# Patient Record
Sex: MALE | Race: WHITE | NOT HISPANIC OR LATINO | Employment: FULL TIME | RURAL
[De-identification: names, ages, dates, MRNs, and addresses within clinical notes are randomized per-mention and may not be internally consistent; named-entity substitution may affect disease eponyms.]

---

## 2018-12-28 PROBLEM — K21.9 GASTROESOPHAGEAL REFLUX DISEASE WITHOUT ESOPHAGITIS: Chronic | Status: ACTIVE | Noted: 2018-12-28

## 2018-12-28 PROBLEM — Z72.0 TOBACCO ABUSE: Chronic | Status: ACTIVE | Noted: 2018-12-28

## 2019-01-17 PROBLEM — E78.2 MIXED HYPERLIPIDEMIA: Chronic | Status: ACTIVE | Noted: 2019-01-17

## 2019-01-17 PROBLEM — D17.9 LIPOMA: Chronic | Status: ACTIVE | Noted: 2019-01-17

## 2019-01-17 PROBLEM — I10 ESSENTIAL HYPERTENSION: Chronic | Status: ACTIVE | Noted: 2019-01-17

## 2020-01-24 PROBLEM — F17.200 SMOKING ADDICTION: Status: ACTIVE | Noted: 2020-01-24

## 2020-12-01 ENCOUNTER — HISTORICAL (OUTPATIENT)
Dept: ADMINISTRATIVE | Facility: HOSPITAL | Age: 60
End: 2020-12-01

## 2020-12-02 LAB — SARS-COV+SARS-COV-2 AG RESP QL IA.RAPID: NEGATIVE

## 2021-02-22 ENCOUNTER — TELEPHONE (OUTPATIENT)
Dept: GASTROENTEROLOGY | Facility: CLINIC | Age: 61
End: 2021-02-22

## 2025-02-07 PROBLEM — Z86.73 H/O: CVA (CEREBROVASCULAR ACCIDENT): Chronic | Status: ACTIVE | Noted: 2025-02-07

## 2025-02-13 ENCOUNTER — CLINICAL SUPPORT (OUTPATIENT)
Dept: REHABILITATION | Facility: HOSPITAL | Age: 65
End: 2025-02-13
Payer: MEDICARE

## 2025-02-13 DIAGNOSIS — R47.1 DYSARTHRIA: ICD-10-CM

## 2025-02-13 PROCEDURE — 92523 SPEECH SOUND LANG COMPREHEN: CPT | Mod: PO

## 2025-02-16 NOTE — PROGRESS NOTES
"  Outpatient Rehab    Speech-Language Pathology Evaluation (only)    Patient Name: Michael Dailey  MRN: [id number]  YOB: 1960  Today's Date: 2/16/2025    Therapy Diagnosis:   Encounter Diagnosis   Name Primary?    Dysarthria      Physician: Ivonne Cortes MD    Physician Orders: Eval and Treat  Medical Diagnosis: Dysarthria [R47.1]     Visit # / Visits Authorized:  1 / 1   Date of Evaluation:  2/13/2025  Insurance Authorization Period: 2/7/2025 to 12/31/2025  Plan of Care Certification:  2/13/2025 to 5/8/2025      Time In: 9:30  Time Out: 10:15  Total Time: 45  Total Billable Time: 45    Intake Outcome Measure for FOTO Survey  Due next session.    Subjective   History of Present Illness  Michael is a 64 y.o. male who reports to Speech-Language Pathology with a chief concern of Dysarthria and expressive aphasia. According to the patient's chart, Michael has a past medical history of GERD (gastroesophageal reflux disease), Hyperlipidemia, Hypertension, Insomnia, Pancreatitis, and Stroke.    Diagnostic tests related to this condition: VFSS/MBSS, X-ray, CT Scan, and MRI Studies.   CT Scan Details: 4/13/24 CTA: "FINDINGS:  CTA NECK:  ARCH: Common origin of the innominate and left common carotid arteries.  COMMON CAROTIDS: Proximal atherosclerosis without flow-limiting stenosis on the left.  EXTERNAL CAROTIDS: Normal.  INTERNAL CAROTIDS: Small amount of thrombus projecting into the lumen at the origin of the left internal carotid artery. Calcified and non-calcified plaque is present at both origin of the internal carotids, left more than right, with 70% stenosis on the left.  VERTEBRALS: Left proximal atherosclerosis without flow-limiting stenosis.  Dominant left vertebral artery. Hypoplasia of the right vertebral artery.  CTA HEAD:  INTERNAL CAROTIDS: Siphon atherosclerosis without flow-limiting stenosis seen bilaterally.  ANTERIOR CEREBRALS: Normal.  ANTERIOR COMMUNICATING: Normal.  MIDDLE CEREBRALS: Irregular " "caliber of the left M1.  POSTERIOR COMMUNICATING ARTERIES: Not well visualized bilaterally.    POSTERIOR CEREBRALS: Normal.  VERTEBROBASILAR AND BRANCH VESSELS: Normal.      MRI Studies Details: 4/10/24: "FINDINGS: Abnormal restricted diffusion and mildly expansile FLAIR signal abnormality in the left MCA territory, predominantly within the left frontal lobe, left insula, and basal ganglia with small focus in the left parietal lobe.. No evidence of abnormal susceptibility artifact or of intracranial hemorrhage . There is no mass effect or midline shift. The basal cisterns remain patent. . The ventricles are normal in size and position. There is no evidence of hydrocephalous.. The left ICA signal void is not preserved. The left MCA signal void appears narrowed but preserved."  VFSS/MBSS Details: 4/24/24: "Findings: Videofluoroscopic solid examination was performed in conjunction with the speech pathology department. Thin liquids and solid foods were given to the patient coated with barium to assess swallowing mechanism. Normal oral transit was observed. There was penetration without aspiration."    X-Ray Details: 4/19/24 XRAY ABDOMEN: "There is a small amount of stool burden in the colon. The bowel gas pattern is nonspecific. No free air or pneumatosis is identified. There is no evidence for organomegaly. No significant abnormal calcifications are seen."    Patient presents in clinic breathing with Room air.    Michael Dailey is a 64 year old male who presents to [facility] Therapy for evaluation secondary to Dysarthria (R47.1). Patient was referred to therapy by Ivonne Cortes, which is the patient's PCP. Patient and significant other report he has significant difficulty with communication, including word finding and formulating responses. Patient endorses difficulty concentrating on tasks and during conversations but denies general memory deficits. Patient's significant other also " expresses concerns about possible apraxia.     Current Speech Symptoms  Patient reports: Aphasia, Apraxia, Dysarthria    Pain  No Pain Reported      Review of Systems  Patient reports: Sleep Disturbance      Treatment History  Previous Treatments: Speech language therapy, Occupational therapy    Living Arrangements  Living Situation  Housing: Home independently  Living Arrangements: Spouse/significant other  Support Systems: Spouse/significant other    Employment  Employment Status: Retired     Past Medical History/Physical Systems Review:   Michael Dailey  has a past medical history of GERD (gastroesophageal reflux disease), Hyperlipidemia, Hypertension, Insomnia, Pancreatitis, and Stroke.    Michael Dailey  has no past surgical history on file.    Michael has a current medication list which includes the following prescription(s): amlodipine, atorvastatin, clopidogrel, fenofibrate, and omeprazole.    Review of patient's allergies indicates:  No Known Allergies     Objective    Auditory Comprehension  Responds to Name: Yes    Pointing to Items  Intact: Body Parts, Pictures, and Objects     Question Comprehension  Intact: Simple Yes/No Questions and Complex Yes/No Questions     Nonverbal Expression  The patient's nonverbal expression skills with Head nodding were assessed during the evaluation.      Effective: Head Nodding    Verbal Expression  Verbal Expression Assessment Supportive Technology Usage: No     Cognition  Orientation level is Oriented x4.      Observed attention impairments include Sustained attention.   Processing speed is Intact.      The patient demonstrates the ability to follow Two-step commands.           Cognitive-Communication  Social Communication  The patient's behavior and affect were observed and the patient was found to be Appropriate to situation.       Joint attention was evaluated and the patient was Able to respond.    Intact: Response to Humor/Figurative Language and Eye Contact    Communication  Modes and Devices     Patient Expressive Communication Modes: Verbal, Gestures  Verbalizations: Kirvin or scripted speech, Phrases/sentences, Single words  Current Receptive Communication Modes: Auditory      Western Aphasia Battery - Revised (WAB-R) was administered to evaluate the patient's receptive and expressive language function.The purpose stated in the manual for the WAB-R is to determine the presence, severity, and type of aphasia; measure the patient's level of performance to provide a baseline for detecting change over time; provide a comprehensive assessment of the patients language strengths and deficits in order to guide treatment and management; infer the location and etiology of the lesion causing the aphasia.  The entire assessment was unable to be completed due to time constraints but will be completed in subsequent treatment sessions. The following results were revealed:      Aphasia Classification: TBD pending completion of assessment in next session.     Subtest Results:   Information Content: 8 / 10 ,   Fluency, Grammatical Competence, and Paraphasias: 9 /10   Yes / No Questions: 60 / 60  Auditory Word Recognition: 58 / 60   Sequential Commands: 62 / 80    Repetition: Unable to finish due to time constraints   Object Naming: Unable to finish due to time constraints   Word Fluency: Unable to finish due to time constraints   Sentence Completion: Unable to finish due to time constraints   Responsive Speech: Unable to finish due to time constraints       Assessment & Plan   Assessment   Diagnosis and Impressions: Patient presents to [facility] for evaluation following a medical diagnosis of Dysarthria (R47.1). Patient presents with expressive aphasia characterized by significant difficulty with overall communication, including word finding and formulating responses during conversational exchanges. His significant other, present during the evaluation, reports that they practice  communication strategies at home regularly, but he continues to struggle. Portions of the WAB were administered today; however, the assessment could not be fully completed due to time constraints. WAB to be continued. Mild dysarthric characteristics were noted, including slightly reduced vocal intensity and occasional breathiness; however, speech remained  intelligible to an unfamiliar listener. During informal assessment, the patient's significant other primarily provided responses, limiting the ability to fully assess his spontaneous speech. Speech and language abilities were instead observed throughout the evaluation.     Evaluation/Treatment Response: Patient responded to treatment well     Patient Goal for Therapy (SLP): To be able to communicate effectively.  Prognosis: Good         Goals  Active       LTG 1        Start:  02/16/25    Expected End:  05/08/25       Pt will develop functional expressive language skills to communicate wants,needs, and emotions effectively to variety of communication partners in medical and home environments         LTG 2        Start:  02/16/25    Expected End:  05/08/25       Pt will participate in ongoing assessment of cognitive-linguistic skills.          STG 1       Start:  02/16/25    Expected End:  03/30/25       Patient will complete subtests of the WAB to further characterize aphasia and establish goals         Education  Education was done with Patient and Other recipient present. The patient's learning style includes Listening. The patient Demonstrates understanding.  They identified as Spouse/significant other. The reported learning style is Listening. The recipient Verbalizes understanding and Demonstrates understanding.     Plan  From a speech language pathology perspective, the patient would benefit from: Skilled Rehab Services  Planned therapy interventions and modalities include: Speech/language therapy, Patient/family education, and Home program instruction.     Planned evaluations to include: Speech/language evaluation.        Visit Frequency: 2 times Per Week for 12 Weeks.     This plan was discussed with Patient and Caregiver.  Discussion participants: Agreed Upon Plan of Care     Patient's spiritual, cultural, and educational needs considered and patient agreeable to plan of care and goals.     Goals:   Active       Long Term Goals       LTG 1        Start:  02/16/25    Expected End:  05/08/25       Pt will develop functional expressive language skills to communicate wants,needs, and emotions effectively to variety of communication partners in medical and home environments         LTG 2        Start:  02/16/25    Expected End:  05/08/25       Pt will participate in ongoing assessment of cognitive-linguistic skills.             Short Term Goals        STG 1       Start:  02/16/25    Expected End:  03/30/25       Patient will complete subtests of the WAB to further characterize aphasia and establish goals              YESSI Lanier  02/16/2025

## 2025-02-20 ENCOUNTER — OFFICE VISIT (OUTPATIENT)
Dept: PRIMARY CARE CLINIC | Facility: CLINIC | Age: 65
End: 2025-02-20
Payer: MEDICARE

## 2025-02-20 VITALS
WEIGHT: 185.31 LBS | HEART RATE: 76 BPM | BODY MASS INDEX: 25.94 KG/M2 | DIASTOLIC BLOOD PRESSURE: 60 MMHG | RESPIRATION RATE: 18 BRPM | OXYGEN SATURATION: 98 % | HEIGHT: 71 IN | SYSTOLIC BLOOD PRESSURE: 110 MMHG

## 2025-02-20 DIAGNOSIS — R49.0 RASPY VOICE: ICD-10-CM

## 2025-02-20 DIAGNOSIS — Z51.81 MEDICATION MONITORING ENCOUNTER: ICD-10-CM

## 2025-02-20 DIAGNOSIS — E78.2 MIXED HYPERLIPIDEMIA: Chronic | ICD-10-CM

## 2025-02-20 DIAGNOSIS — J44.9 CHRONIC OBSTRUCTIVE PULMONARY DISEASE, UNSPECIFIED COPD TYPE: ICD-10-CM

## 2025-02-20 DIAGNOSIS — R47.01 EXPRESSIVE APHASIA: ICD-10-CM

## 2025-02-20 DIAGNOSIS — Z87.891 FORMER SMOKER: ICD-10-CM

## 2025-02-20 DIAGNOSIS — Z86.73 HISTORY OF CVA (CEREBROVASCULAR ACCIDENT): ICD-10-CM

## 2025-02-20 DIAGNOSIS — Z13.6 ENCOUNTER FOR SCREENING FOR CARDIOVASCULAR DISORDERS: ICD-10-CM

## 2025-02-20 DIAGNOSIS — G20.A1 PARKINSON'S DISEASE, UNSPECIFIED WHETHER DYSKINESIA PRESENT, UNSPECIFIED WHETHER MANIFESTATIONS FLUCTUATE: ICD-10-CM

## 2025-02-20 DIAGNOSIS — Z76.89 ENCOUNTER TO ESTABLISH CARE: Primary | ICD-10-CM

## 2025-02-20 PROCEDURE — 99214 OFFICE O/P EST MOD 30 MIN: CPT | Mod: PBBFAC,PN | Performed by: STUDENT IN AN ORGANIZED HEALTH CARE EDUCATION/TRAINING PROGRAM

## 2025-02-20 RX ORDER — ATORVASTATIN CALCIUM 80 MG/1
80 TABLET, FILM COATED ORAL DAILY
COMMUNITY
Start: 2024-04-29

## 2025-02-20 RX ORDER — QUETIAPINE FUMARATE 25 MG/1
25-50 TABLET, FILM COATED ORAL NIGHTLY
COMMUNITY

## 2025-02-20 RX ORDER — GEMFIBROZIL 600 MG/1
600 TABLET, FILM COATED ORAL
Qty: 180 TABLET | Refills: 3 | Status: SHIPPED | OUTPATIENT
Start: 2025-02-20

## 2025-02-20 RX ORDER — MULTIVITAMIN
1 TABLET ORAL DAILY
COMMUNITY

## 2025-02-20 RX ORDER — TRAMADOL HYDROCHLORIDE 50 MG/1
50 TABLET ORAL EVERY 6 HOURS PRN
COMMUNITY

## 2025-02-20 RX ORDER — NAPROXEN SODIUM 220 MG/1
1 TABLET, FILM COATED ORAL DAILY
COMMUNITY
Start: 2024-04-29

## 2025-02-20 RX ORDER — CYANOCOBALAMIN 1000 UG/ML
1000 INJECTION, SOLUTION INTRAMUSCULAR; SUBCUTANEOUS
COMMUNITY

## 2025-02-20 RX ORDER — CARBIDOPA AND LEVODOPA 25; 100 MG/1; MG/1
1 TABLET ORAL 3 TIMES DAILY
COMMUNITY
Start: 2025-02-05

## 2025-02-20 RX ORDER — FLUOXETINE HYDROCHLORIDE 40 MG/1
40 CAPSULE ORAL DAILY
COMMUNITY
Start: 2024-12-02

## 2025-02-20 NOTE — PROGRESS NOTES
Subjective:       Patient ID: Michael Dailey is a 64 y.o. male.    Chief Complaint: Establish Care    HPI:  64 y.o. male presents to [facility] to establish care    Acute concerns?: None today's visit  Last April 2024 had CVA. Some difficulty with expressive aphasia.    PCP was in [place], [place].    Following with Neurologist in [place], [place]. Dr. Sanchez    Needing to establish care.    Uncertain if weakness/poor coordination to RUE and hand. Occasionally may need Tramadol for pain. Maybe 1-2 times weekly at most.    2 times weekly working with speech therapy for expressive aphasia. Also Dx apraxia.    Last PCP?: Dr. Ivonne Cortes  Allergies: NKDA  Medical History: GERD, HLD, HTN, insomnia, pancreatitis, stroke, parkinson's disease, expressive aphasia, apraxia, COPD  Medications: amlidipine 10 mg, clopidogrel 75 mg, omeprazole 40 mg, ASA 81 mg, atorvastatin 80 mg, carbidopa-levodopa  mg tid, fluoxetine 40 mg, gemfibrozil 600 mg, MVI, tramadol 50 mg PRN, quetiapine 25-50 mg qHS  Surgical History: Carotid artery angioplasty, appendectomy  Family History: Sister with unknown cancer; no known autoimmune disease; no dementias  Social History: Quit smoking last year with stroke; no EtOH, no illicits      Last tetanus vaccine?: ~3 years ago      Review of Systems   Constitutional:  Negative for chills, diaphoresis, fatigue and fever.   HENT:  Negative for congestion, sinus pressure, sneezing and sore throat.    Respiratory:  Negative for cough and shortness of breath.    Cardiovascular:  Negative for chest pain and palpitations.   Gastrointestinal:  Negative for abdominal pain, diarrhea, nausea and vomiting.   Musculoskeletal:  Negative for arthralgias, joint swelling and myalgias.   Skin:  Negative for rash and wound.   Neurological:  Positive for weakness (RUE). Negative for dizziness and light-headedness.       Objective:      Vitals:    02/20/25 0942   BP: 110/60   BP Location: Right arm   Patient Position:  "Sitting   Pulse: 76   Resp: 18   SpO2: 98%   Weight: 84.1 kg (185 lb 4.8 oz)   Height: 5' 11" (1.803 m)     Physical Exam  Vitals reviewed.   Constitutional:       General: He is not in acute distress.     Appearance: Normal appearance. He is not ill-appearing.   HENT:      Head: Normocephalic and atraumatic.   Eyes:      General:         Right eye: No discharge.         Left eye: No discharge.      Conjunctiva/sclera: Conjunctivae normal.   Cardiovascular:      Rate and Rhythm: Normal rate and regular rhythm.      Pulses: Normal pulses.      Heart sounds: No murmur heard.  Pulmonary:      Effort: Pulmonary effort is normal.      Breath sounds: Normal breath sounds.   Musculoskeletal:         General: No deformity.      Cervical back: Neck supple. No rigidity.   Lymphadenopathy:      Cervical: No cervical adenopathy.   Skin:     General: Skin is warm and dry.      Coloration: Skin is not jaundiced.   Neurological:      General: No focal deficit present.      Mental Status: He is alert and oriented to person, place, and time.   Psychiatric:         Mood and Affect: Mood normal.         Behavior: Behavior normal.             Lab Results   Component Value Date     04/29/2024     01/21/2020    K 3.8 04/29/2024    K 4.4 01/21/2020     01/21/2020    CO2 28 04/29/2024    CO2 28 01/21/2020    BUN 18.3 04/29/2024    BUN 12 01/21/2020    CREATININE 0.66 (L) 04/29/2024    CREATININE 0.76 01/21/2020    GLUCOSE 105 04/29/2024    ANIONGAP 7 (L) 04/29/2024     Lab Results   Component Value Date    HGBA1C 6.1 08/08/2024    HGBA1C 5.5 01/16/2019     No results found for: "BNP", "BNPTRIAGEBLO"    Lab Results   Component Value Date    WBC 12.1 (H) 01/21/2020    HGB 15.2 01/21/2020    HCT 44.3 01/21/2020     01/21/2020     Lab Results   Component Value Date    CHOL 191 04/10/2024    CHOL 170 01/21/2020    HDL 33 (L) 04/10/2024    HDL 35 (L) 01/21/2020    LDLCALC 101 04/10/2024    LDLCALC 103 (H) 01/21/2020    " TRIG 284 (H) 04/10/2024    TRIG 208 (H) 01/21/2020        Current Medications[1]        Assessment:       1. Encounter to establish care    2. History of CVA (cerebrovascular accident)    3. Expressive aphasia    4. Raspy voice    5. Former smoker    6. Mixed hyperlipidemia    7. Parkinson's disease, unspecified whether dyskinesia present, unspecified whether manifestations fluctuate    8. Chronic obstructive pulmonary disease, unspecified COPD type           Plan:       Encounter to establish care    History of CVA (cerebrovascular accident)  Parkinson's disease, unspecified whether dyskinesia present, unspecified whether manifestations fluctuate  Expressive aphasia  Raspy voice  -     Ambulatory referral/consult to ENT; Future; Expected date: 02/27/2025  - With reports of recent raspy voice, will refer to ENT for visual examination  - Keep follow-up with Dr. Sanchez, no recent worsening symptoms    Mixed hyperlipidemia  -     gemfibroziL (LOPID) 600 MG tablet; Take 1 tablet (600 mg total) by mouth 2 (two) times daily before meals.  Dispense: 180 tablet; Refill: 3    Chronic obstructive pulmonary disease, unspecified COPD type  Former smoker  -      Abdominal Aorta; Future; Expected date: 02/20/2025  - No decreased stamina or shortness of breath reported a tthis time  - Continue smoking cessation    RTC in 6 months         [1]   Current Outpatient Medications:     amLODIPine (NORVASC) 10 MG tablet, Take 1 tablet (10 mg total) by mouth once daily., Disp: 90 tablet, Rfl: 0    aspirin 81 MG Chew, Take 1 tablet by mouth once daily., Disp: , Rfl:     atorvastatin (LIPITOR) 80 MG tablet, Take 80 mg by mouth once daily., Disp: , Rfl:     carbidopa-levodopa  mg (SINEMET)  mg per tablet, Take 1 tablet by mouth 3 (three) times daily., Disp: , Rfl:     clopidogreL (PLAVIX) 75 mg tablet, Take 1 tablet (75 mg total) by mouth once daily., Disp: 90 tablet, Rfl: 0    cyanocobalamin 1,000 mcg/mL injection, Inject 1,000  mcg into the muscle every 30 days., Disp: , Rfl:     FLUoxetine 40 MG capsule, Take 40 mg by mouth once daily., Disp: , Rfl:     multivit with min-folic acid 0.4 mg Tab, Take 1 tablet by mouth once daily., Disp: , Rfl:     omeprazole (PRILOSEC) 40 MG capsule, Take 1 capsule (40 mg total) by mouth once daily., Disp: 90 capsule, Rfl: 0    QUEtiapine (SEROQUEL) 25 MG Tab, Take 25-50 mg by mouth every evening., Disp: , Rfl:     traMADoL (ULTRAM) 50 mg tablet, Take 50 mg by mouth every 6 (six) hours as needed., Disp: , Rfl:     gemfibroziL (LOPID) 600 MG tablet, Take 1 tablet (600 mg total) by mouth 2 (two) times daily before meals., Disp: 180 tablet, Rfl: 3

## 2025-02-21 ENCOUNTER — CLINICAL SUPPORT (OUTPATIENT)
Dept: REHABILITATION | Facility: HOSPITAL | Age: 65
End: 2025-02-21
Payer: MEDICARE

## 2025-02-21 DIAGNOSIS — R48.2 APRAXIA: ICD-10-CM

## 2025-02-21 DIAGNOSIS — R47.01 EXPRESSIVE APHASIA: Primary | ICD-10-CM

## 2025-02-21 PROCEDURE — 92507 TX SP LANG VOICE COMM INDIV: CPT | Mod: PO

## 2025-02-21 NOTE — PROGRESS NOTES
Outpatient Rehab    Speech-Language Pathology Visit    Patient Name: Michael Dailey  MRN: [id number]  YOB: 1960  Today's Date: 2/21/2025    Therapy Diagnosis:   Encounter Diagnoses   Name Primary?    Expressive aphasia Yes    Apraxia      Physician: Ivonne Cortes MD    Physician Orders: Eval and Treat  Medical Diagnosis: Physician Orders: Eval and Treat  Medical Diagnosis: Dysarthria [R47.1]    Visit # / Visits Authorized:  1 / 20   Date of Evaluation:  2/13/2025   Insurance Authorization Period: 2/10/2025 to 12/31/2025  Plan of Care Certification: 2/13/2025 to 5/8/2025       Time In: 10:32  Time Out: 11:00  Total Time: 28  Total Billable Time: 28    FOTO:  Due    Subjective        Patient reported no pain and showed up in pleasant spirits. Significant other attended session.     Objective          Treatment  Short Term Goals: (10 weeks) Current Progress:   Patient will complete subtests of the WAB to further characterize aphasia and establish goals    Progressing/ Not Met 2/21/2025   - Finished subtest this date.   Goal met 2/21/25     See results below.      Goal added this date: Patient will provide 3+ words to describe a given picture/object at 80% success given min cues for initiation/word retrieval      Progressing/ Not Met 2/21/2025   Goal added this date    Goal added this date: Patient will imitate words of increasing syllable length, starting with single-syllable words and progressing to multi-syllabic words, with 80% accuracy    Progressing/ Not Met 2/21/2025   Goal added this date     Goal added this date: Patient will expressively produce 3+ word phrase to state object function with 80% acc given initial phoneme cue as needed across 3 sessions.      Progressing/ Not Met 2/21/2025   Goal added this date     Goal added this date: Patient will complete divided attention tasks in natural environment with 90% accuracy independently.     Progressing/ Not Met 2/21/2025   Goal added this date    "  Goal added this date: Patient will respond accurately to simple "wh" questions for one word response with 90% acc      Progressing/ Not Met 2025   Goal added this date     Goal added this date:  Patient will generate 10-12 items from an abstract category with a time constraint of 1 minute.     Progressing/ Not Met 2025   Goal added this date         Western Aphasia Battery - Revised (WAB-R) was administered to evaluate the patient's receptive and expressive language function.The purpose stated in the manual for the WAB-R is to determine the presence, severity, and type of aphasia; measure the patient's level of performance to provide a baseline for detecting change over time; provide a comprehensive assessment of the patients language strengths and deficits in order to guide treatment and management; infer the location and etiology of the lesion causing the aphasia.  The following results were revealed:     Spontaneous Speech Score: 17 / 20  Auditory Comprehension Score: 9 / 10  Repetition Score:   8.8 /10  Naming and Word Finding Score: 8.6/ 10  Aphasia Quotient (AQ):   86.8 / 100  Aphasia Classification: Anomic Aphasia    Subtest Results:   Information Content: 8 / 10  Fluency, Grammatical Competence, and Paraphasias: 9 /10  Yes / No Questions: 60 / 60  Auditory Word Recognition: 58 / 60  Sequential Commands: 62 / 80  Repetition: 88 /100  Object Namin /60  Word Fluency: 7 / 20  Sentence Completion: 9 /10  Responsive Speech: 10 / 10    The patient exhibits mild-moderate expressive language deficits, significantly impacting his ability to effectively communicate wants and needs.     Assessment & Plan   Assessment  Patient participated well in today's session focused on completion of WAB assessment. See results above. New goals added this date given results of WAB as well as patient requested goals. Patient presents with mild-moderate expressive language deficits at this time in addition to mild " apraxia of speech. Patient exhibits good self awareness of errors and is highly motivated to participate in speech therapy to target expressive language deficits. No dysarthric speech characteristics noted in today's session. Goals to be updated as necessary. See above for new added goals. Patient prognosis is Good. Patient will continue to benefit from skilled outpatient speech and language therapy to address the deficits listed in the problem list on initial evaluation, provide patient/family education and to maximize patient's level of independence in the home and community environment.     Patient will continue to benefit from skilled outpatient speech therapy to address the deficits listed in the problem list box on initial evaluation, provide pt/family education and to maximize pt's level of independence in the home and community environment.     Patient's spiritual, cultural, and educational needs considered and patient agreeable to plan of care and goals.     Plan  Continue plan of care as established.       MARCIN Lanier-SLP  02/21/2025

## 2025-02-24 ENCOUNTER — CLINICAL SUPPORT (OUTPATIENT)
Dept: REHABILITATION | Facility: HOSPITAL | Age: 65
End: 2025-02-24
Payer: MEDICARE

## 2025-02-24 DIAGNOSIS — R47.01 EXPRESSIVE APHASIA: Primary | ICD-10-CM

## 2025-02-24 DIAGNOSIS — R48.2 APRAXIA: ICD-10-CM

## 2025-02-24 PROCEDURE — 92507 TX SP LANG VOICE COMM INDIV: CPT | Mod: PO

## 2025-02-24 NOTE — PROGRESS NOTES
Outpatient Rehab    Speech-Language Pathology Visit    Patient Name: Michael Dailey  MRN: [id number]  YOB: 1960  Today's Date: 2/24/2025    Therapy Diagnosis:   Encounter Diagnoses   Name Primary?    Expressive aphasia Yes    Apraxia      Physician: Ivonne Cortes MD    Physician Orders: Eval and Treat  Medical Diagnosis: Physician Orders: Eval and Treat  Medical Diagnosis: Dysarthria [R47.1]    Visit # / Visits Authorized:  2 / 20   Date of Evaluation:  2/13/2025   Insurance Authorization Period: 2/10/2025 to 12/31/2025  Plan of Care Certification: 2/13/2025 to 5/8/2025       Time In: 1:00  Time Out: 1:45  Total Time: 45  Total Billable Time: 45    Subjective        Patient reports no pain and showed up in pleasant spirits.     Objective          Treatment  Short Term Goals: (10 weeks) Current Progress:   Patient will complete subtests of the WAB to further characterize aphasia and establish goals    Progressing/ Not Met 2/24/2025      Goal met 2/21/25          2. Patient will provide 3+ words to describe a given picture/object at 80% success given min cues for initiation/word retrieval      Progressing/ Not Met 2/24/2025   - Not addressed in today's session.     3. Patient will imitate words of increasing syllable length, starting with single-syllable words and progressing to multi-syllabic words, with 80% accuracy    Progressing/ Not Met 2/24/2025   - Patient able to imitate single syllable words with 100% accuracy     -Patient given pacing board for multisyllabic words; 2 syllable words produced with 80% accuracy; 3 syllable words produced with 70% accuracy     4. Patient will expressively produce 3+ word phrase to state object function with 80% acc given initial phoneme cue as needed across 3 sessions.      Progressing/ Not Met 2/24/2025   - Patient expressively produced 2-3 word phrases to state object function with 70% accuracy given moderate cues     - Patient use of 3 word phrases consistently  "     5. Patient will complete divided attention tasks in natural environment with 90% accuracy independently.     Progressing/ Not Met 2/24/2025   Not addressed in today's session.     6.  Patient will respond accurately to simple "wh" questions for one word response with 90% acc      Progressing/ Not Met 2/24/2025   - Patient responded accurately to simple "wh" questions with 80% accuracy     - More difficulty with "why" questions        7.  Patient will generate 10-12 items from an abstract category with a time constraint of 1 minute.     Progressing/ Not Met 2/24/2025   - Patient able to generate 4/10 things that could be found in the fridge in 1 minute         Assessment & Plan   Assessment  Patient participated well in today's session focused on expressive/receptive language. Reported no pain in today's session. Patient appropriately named object functions with 2 word describers and occasional 3 words; some difficulty noted at first but once patient focused on task, he did it appropriately. Patient's production of 2 syllable words significantly better than 3+ syllable words due to AOS. Pacing board utilized during multi-syllabic word production; increase in accuracy of word production when using pacing board. Discussed use of pacing board in future session and patient verbalized understanding and agreement. Increase in target word production for "things found in fridge" when using visual cues (I.e. alphabet cards) out of time constraint. Patient able to accurately verbalize word once correct initial letter is found. Some difficulty with 'why' questions in today's session. Goals to be updated as necessary. See above for new added goals. Patient prognosis is Good. Patient will continue to benefit from skilled outpatient speech and language therapy to address the deficits listed in the problem list on initial evaluation, provide patient/family education and to maximize patient's level of independence in the home " and community environment.     Patient will continue to benefit from skilled outpatient speech therapy to address the deficits listed in the problem list box on initial evaluation, provide pt/family education and to maximize pt's level of independence in the home and community environment.     Patient's spiritual, cultural, and educational needs considered and patient agreeable to plan of care and goals.     Plan  Continue plan of care as established.       MARCIN Lanier-SLP  02/24/2025

## 2025-02-25 ENCOUNTER — RESULTS FOLLOW-UP (OUTPATIENT)
Dept: PRIMARY CARE CLINIC | Facility: CLINIC | Age: 65
End: 2025-02-25

## 2025-02-26 ENCOUNTER — CLINICAL SUPPORT (OUTPATIENT)
Dept: REHABILITATION | Facility: HOSPITAL | Age: 65
End: 2025-02-26
Payer: MEDICARE

## 2025-02-26 DIAGNOSIS — R48.2 APRAXIA: ICD-10-CM

## 2025-02-26 DIAGNOSIS — R47.01 EXPRESSIVE APHASIA: Primary | ICD-10-CM

## 2025-02-26 PROCEDURE — 92507 TX SP LANG VOICE COMM INDIV: CPT | Mod: PO

## 2025-02-26 NOTE — PROGRESS NOTES
Outpatient Rehab    Speech-Language Pathology Visit    Patient Name: Michael Dailey  MRN: [id number]  YOB: 1960  Today's Date: 2/26/2025    Therapy Diagnosis:   Encounter Diagnoses   Name Primary?    Expressive aphasia Yes    Apraxia      Physician: Ivonne Cortes MD    Physician Orders: Eval and Treat  Medical Diagnosis: Physician Orders: Eval and Treat  Medical Diagnosis: Dysarthria [R47.1]    Visit # / Visits Authorized:  3 / 20   Date of Evaluation:  2/13/2025   Insurance Authorization Period: 2/10/2025 to 12/31/2025  Plan of Care Certification: 2/13/2025 to 5/8/2025       Time In: 1:00  Time Out: 1:45  Total Time: 45  Total Billable Time: 45    Subjective        Patient reports no pain and showed up in pleasant spirits. Significant other did not attend session this date.     Objective          Treatment  Short Term Goals: (10 weeks) Current Progress:   Patient will complete subtests of the WAB to further characterize aphasia and establish goals    Progressing/ Not Met 2/26/2025      Goal met 2/21/25          2. Patient will provide 3+ words to describe a given picture/object at 80% success given min cues for initiation/word retrieval      Progressing/ Not Met 2/26/2025   - Not addressed in today's session.     3. Patient will imitate words of increasing syllable length, starting with single-syllable words and progressing to multi-syllabic words, with 80% accuracy    Progressing/ Not Met 2/26/2025   -Patient utilized pacing board for multisyllabic words; 2 syllable words produced with 85% accuracy; 3 syllable words produced with 70% accuracy    -Patient requires multiple prompts to use pacing board      4. Patient will expressively produce 3+ word phrase to state object function with 80% acc given initial phoneme cue as needed across 3 sessions.      Progressing/ Not Met 2/26/2025   - Not addressed in today's session.       5. Patient will complete divided attention tasks in natural environment  "with 90% accuracy independently.     Progressing/ Not Met 2/26/2025   - Patient able to complete divided attention task with natural background noise with 85% accuracy       6.  Patient will respond accurately to simple "wh" questions for one word response with 90% acc      Progressing/ Not Met 2/26/2025   - Patient responded accurately to simple "wh" questions with 85% accuracy     - Significant difficulty with "why" questions in today's session        7.  Patient will generate 10-12 items from an abstract category with a time constraint of 1 minute.     Progressing/ Not Met 2/26/2025   - Patient able to generate 6/10 "things that could be found in the fridge" in 1 minute    -Given a broader category "things found in the kitchen" 8/10     - Patient able to generate 7/10 vegetables independently          Assessment & Plan   Assessment  Patient participated well in today's session focused on expressive/receptive language and divided attention. Patient appropriately named object functions with 2 word describers and occasional 3 words; some difficulty noted at first but once patient focused on task, he did it appropriately. Patient's production of 2 syllable words significantly better than 3+ syllable words due to AOS. Pacing board utilized during multi-syllabic word production; increase in accuracy of word production when using pacing board. Discussed use of pacing board in future session and patient verbalized understanding and agreement. Increase in target word production for "things found in fridge" when using visual cues (I.e. alphabet cards) out of time constraint. Patient able to accurately verbalize word once correct initial letter is found. Some difficulty with 'why' questions in today's session. Prompted to use word finding strategies when unable to think of target words/answers. Goals to be updated as necessary. See above for new added goals. Patient prognosis is Good. Patient will continue to benefit from " skilled outpatient speech and language therapy to address the deficits listed in the problem list on initial evaluation, provide patient/family education and to maximize patient's level of independence in the home and community environment.     Patient will continue to benefit from skilled outpatient speech therapy to address the deficits listed in the problem list box on initial evaluation, provide pt/family education and to maximize pt's level of independence in the home and community environment.     Patient's spiritual, cultural, and educational needs considered and patient agreeable to plan of care and goals.     Plan  Continue plan of care as established.       MARCIN Lanier-SLP  02/26/2025

## 2025-03-05 RX ORDER — CYANOCOBALAMIN 1000 UG/ML
1000 INJECTION, SOLUTION INTRAMUSCULAR; SUBCUTANEOUS
Qty: 1 ML | Refills: 11 | Status: SHIPPED | OUTPATIENT
Start: 2025-03-05

## 2025-03-05 NOTE — TELEPHONE ENCOUNTER
Refill Routing Note   Medication(s) are not appropriate for processing by [facility] for the following reason(s):        Outside of protocol    [facility] action(s):  Route               Appointments  past 12m or future 3m with PCP    Date Provider   Last Visit   2/20/2025 Alessio Bennett MD   Next Visit   Visit date not found Alessio Bennett MD   ED visits in past 90 days: 0        Note composed:9:07 AM 03/05/2025

## 2025-03-10 ENCOUNTER — CLINICAL SUPPORT (OUTPATIENT)
Dept: REHABILITATION | Facility: HOSPITAL | Age: 65
End: 2025-03-10
Payer: MEDICARE

## 2025-03-10 ENCOUNTER — OFFICE VISIT (OUTPATIENT)
Dept: OTOLARYNGOLOGY | Facility: CLINIC | Age: 65
End: 2025-03-10
Payer: MEDICARE

## 2025-03-10 VITALS — HEIGHT: 71 IN | BODY MASS INDEX: 25.89 KG/M2 | WEIGHT: 184.94 LBS

## 2025-03-10 DIAGNOSIS — R47.01 EXPRESSIVE APHASIA: Primary | ICD-10-CM

## 2025-03-10 DIAGNOSIS — R49.0 RASPY VOICE: ICD-10-CM

## 2025-03-10 DIAGNOSIS — R48.2 APRAXIA: ICD-10-CM

## 2025-03-10 DIAGNOSIS — R47.1 DYSARTHRIA: ICD-10-CM

## 2025-03-10 DIAGNOSIS — R47.01 EXPRESSIVE APHASIA: ICD-10-CM

## 2025-03-10 DIAGNOSIS — K21.9 LARYNGOPHARYNGEAL REFLUX (LPR): ICD-10-CM

## 2025-03-10 DIAGNOSIS — Z86.73 HISTORY OF CVA (CEREBROVASCULAR ACCIDENT): ICD-10-CM

## 2025-03-10 DIAGNOSIS — R49.0 HOARSENESS: Primary | ICD-10-CM

## 2025-03-10 PROCEDURE — 99999 PR PBB SHADOW E&M-EST. PATIENT-LVL III: CPT | Mod: PBBFAC,,, | Performed by: OTOLARYNGOLOGY

## 2025-03-10 PROCEDURE — 92507 TX SP LANG VOICE COMM INDIV: CPT | Mod: PO

## 2025-03-10 PROCEDURE — 31575 DIAGNOSTIC LARYNGOSCOPY: CPT | Mod: PBBFAC,PO | Performed by: OTOLARYNGOLOGY

## 2025-03-10 PROCEDURE — 99203 OFFICE O/P NEW LOW 30 MIN: CPT | Mod: 25,S$PBB,, | Performed by: OTOLARYNGOLOGY

## 2025-03-10 PROCEDURE — 99213 OFFICE O/P EST LOW 20 MIN: CPT | Mod: PBBFAC,PO | Performed by: OTOLARYNGOLOGY

## 2025-03-10 PROCEDURE — 31575 DIAGNOSTIC LARYNGOSCOPY: CPT | Mod: S$PBB,,, | Performed by: OTOLARYNGOLOGY

## 2025-03-10 NOTE — PROGRESS NOTES
"Subjective:       Patient ID: Michael Dailey is a 64 y.o. male.    Chief Complaint: Hoarse (Patient has hx of CVA in April 24, has expressive dysphagia and presenting with "raspy/hoarse voice." Here for evaluation of the hoarseness to determine if it's a result of the stroke or something different. )      This gentleman has a CVA last year and he just does not speak much because he has some expressive aphasia it does not look like he otherwise has a great deal of impairment he walks normally and has a apparently normal cognition.  He has moved down from [place] and stays with his partner and they have noticed that when he does speak which he speaks fine except that it takes him quite a minute to think of the words to use that is a bit hoarse.  He smoked up until last year when he has a CVA.          Objective:      ENT Physical Exam    As we discuss his problem he does not speak much but the words he has said are a bit hoarse a bit raspy as they describe it     We discussed the benefits of a fiberoptic exam that include a better view of the larynx a better view of the anterior aspect of the larynx a more magnified view of the larynx hypopharynx and nasopharynx in the context of the patient's particular complaint and the patient wishes to proceed with this minor examination.  In this particular patient examination with the mirror was attempted but inadequate to provide the necessary exam.    Afrin and lidocaine were atomized into the nasal airway 5 or so minutes were given for the decongestant and anesthetic to take effect and then the flexible fiberoptic laryngoscope was passed through the nasal cavity and the exam proceeded.    The scope was passed on the right without difficulty and proceeded into the nasopharynx which was normal is hypopharynx and oropharynx to the extent visible were normal, his cords move symmetrically and the true cords look normal there has a small one mm nodule at the anterior part of the " arytenoids and there significant redness of the medial arytenoids bilaterally.            Assessment:       1. Hoarseness    2. Expressive aphasia    3. History of CVA (cerebrovascular accident)    4. Raspy voice         Plan:          So his scope exam shows a tiny little nodule on each cord at the anterior portion of the arytenoid and some redness of the medial arytenoids.  He was recently changed to 40 of omeprazole q.a.m. from 20 that he had long been on but that was just few weeks ago.  I have encouraged him to stay on that for at least a few months and if that has not helped his voice to contact me and we will go up to b.i.d. for awhile although his voice isn't that bad I think there were more concern there were something dangerous going on that need attention and I do not see anything worrisome on his fiberoptic laryngeal exam.

## 2025-03-10 NOTE — PROGRESS NOTES
Outpatient Rehab    Speech-Language Pathology Visit    Patient Name: Michael Dailey  MRN: [id number]  YOB: 1960  Today's Date: 3/10/2025    Therapy Diagnosis:   Encounter Diagnoses   Name Primary?    Expressive aphasia Yes    Apraxia     Dysarthria        Physician: Ivonne Cortes MD    Physician Orders: Eval and Treat  Medical Diagnosis: Physician Orders: Eval and Treat  Medical Diagnosis: Dysarthria [R47.1]    Visit # / Visits Authorized:  4 / 20   Date of Evaluation:  2/13/2025   Insurance Authorization Period: 2/10/2025 to 12/31/2025  Plan of Care Certification: 2/13/2025 to 5/8/2025       Time In: 1:00  Time Out: 1:45  Total Time: 45  Total Billable Time: 45    Subjective        Patient reports sleeping well and showed up in pleasant spirits.      Objective          Treatment  Short Term Goals: (10 weeks) Current Progress:   Patient will complete subtests of the WAB to further characterize aphasia and establish goals    Progressing/ Not Met 3/10/2025      Goal met 2/21/25          2. Patient will provide 3+ words to describe a given picture/object at 80% success given min cues for initiation/word retrieval      Progressing/ Not Met 3/10/2025   - Not addressed in today's session.     3. Patient will imitate words of increasing syllable length, starting with single-syllable words and progressing to multi-syllabic words, with 80% accuracy    Progressing/ Not Met 3/10/2025   -Not addressed in today's session.       4. Patient will expressively produce 3+ word phrase to state object function with 80% acc given initial phoneme cue as needed across 3 sessions.      Progressing/ Not Met 3/10/2025   - Patient able to produce 3+ word phrases to state object functions with 80% accuracy given minimal prompts and cues     Met x1       5. Patient will complete divided attention tasks in natural environment with 90% accuracy independently.     Progressing/ Not Met 3/10/2025   - Patient able to complete divided  "attention task with natural background noise with 95% accuracy    Met x1     6.  Patient will respond accurately to simple "wh" questions for one word response with 90% acc      Progressing/ Not Met 3/10/2025   - Patient responded accurately to simple "wh" questions with 90% accuracy       Met x1     7.  Patient will generate 10-12 items from an abstract category with a time constraint of 1 minute.     Progressing/ Not Met 3/10/2025   - Not addressed in today's session.           Assessment & Plan   Assessment  Patient participated well in today's session focused on expressive/receptive language and divided attention. Patient appropriately named object functions with 3 word phrases consistently and occasional 4-5 word phrases. Increase in accuracy when answering 'wh' questions in today's session. Prompted to use word finding strategies when unable to think of target words/answers. Patient completed divided attention task with no difficulty; however, reported it was more difficult than previous tasks. Goals to be updated as necessary. See above for new added goals. Patient prognosis is Good. Patient will continue to benefit from skilled outpatient speech and language therapy to address the deficits listed in the problem list on initial evaluation, provide patient/family education and to maximize patient's level of independence in the home and community environment.     Patient will continue to benefit from skilled outpatient speech therapy to address the deficits listed in the problem list box on initial evaluation, provide pt/family education and to maximize pt's level of independence in the home and community environment.     Patient's spiritual, cultural, and educational needs considered and patient agreeable to plan of care and goals.     Plan  Continue plan of care as established.       MARCIN Lanier-SLP  03/10/2025            "

## 2025-03-26 ENCOUNTER — CLINICAL SUPPORT (OUTPATIENT)
Dept: REHABILITATION | Facility: HOSPITAL | Age: 65
End: 2025-03-26
Payer: MEDICARE

## 2025-03-26 DIAGNOSIS — R48.2 APRAXIA: ICD-10-CM

## 2025-03-26 DIAGNOSIS — R47.01 EXPRESSIVE APHASIA: Primary | ICD-10-CM

## 2025-03-26 DIAGNOSIS — R47.1 DYSARTHRIA: ICD-10-CM

## 2025-03-26 PROCEDURE — 92507 TX SP LANG VOICE COMM INDIV: CPT | Mod: PO

## 2025-03-26 NOTE — PROGRESS NOTES
Outpatient Rehab    Speech-Language Pathology Visit    Patient Name: Michael Dailey  MRN: [id number]  YOB: 1960  Today's Date: 3/26/2025    Therapy Diagnosis:   Encounter Diagnoses   Name Primary?    Expressive aphasia Yes    Apraxia     Dysarthria          Physician: Ivonne Cortes MD    Physician Orders: Eval and Treat  Medical Diagnosis: Physician Orders: Eval and Treat  Medical Diagnosis: Dysarthria [R47.1]    Visit # / Visits Authorized:  5 / 20   Date of Evaluation:  2/13/2025   Insurance Authorization Period: 2/10/2025 to 12/31/2025  Plan of Care Certification: 2/13/2025 to 5/8/2025       Time In: 10:15  Time Out: 11:00  Total Time: 45  Total Billable Time: 45    Subjective        Patient reports sleeping well and showed up in pleasant spirits. Patient's spouse reports car going into the shop in the upcoming days. Will r/s appointments if need be.     Objective          Treatment  Short Term Goals: (10 weeks) Current Progress:   Patient will complete subtests of the WAB to further characterize aphasia and establish goals    Progressing/ Not Met 3/26/2025      Goal met 2/21/25          2. Patient will provide 3+ words to describe a given picture/object at 80% success given min cues for initiation/word retrieval      Progressing/ Not Met 3/26/2025   - Patient able to provide 5+ words to describe pictures with 95% accuracy and minimal cues      3. Patient will imitate words of increasing syllable length, starting with single-syllable words and progressing to multi-syllabic words, with 80% accuracy    Progressing/ Not Met 3/26/2025   -Not addressed in today's session.       4. Patient will expressively produce 3+ word phrase to state object function with 80% acc given initial phoneme cue as needed across 3 sessions.      Progressing/ Not Met 3/26/2025   - Not addressed in today's session.      Met x1       5. Patient will complete divided attention tasks in natural environment with 90% accuracy  "independently.     Progressing/ Not Met 3/26/2025   - Not addressed in today's session.      Met x1     6.  Patient will respond accurately to simple "wh" questions for one word response with 90% acc      Progressing/ Not Met 3/26/2025   - Patient responded accurately to simple "wh" questions with 90% accuracy     - More trouble with 'when' and 'why' questions this date    Met x2     7.  Patient will generate 10-12 items from an abstract category with a time constraint of 1 minute.     Progressing/ Not Met 3/26/2025   - Pt generated items from below categories within 1 minute:     - 5 fruits  - 3 sports  - 2 tools (3 tools using gestures to show function of tool)            Assessment & Plan   Assessment  Patient participated well in today's session focused on expressive/receptive language. More difficulty when answering 'wh' questions in today's session; however, patient was able to complete with appropriate accuracy. Patient able to describe pictures provided with 5+ words and used occasional word finding strategies. GERD management handout given this day due to patient's report of indigestion. Difficulty generating items in abstract category this date. Goals to be updated as necessary. See above for new added goals. Patient prognosis is Good. Patient will continue to benefit from skilled outpatient speech and language therapy to address the deficits listed in the problem list on initial evaluation, provide patient/family education and to maximize patient's level of independence in the home and community environment.     Patient will continue to benefit from skilled outpatient speech therapy to address the deficits listed in the problem list box on initial evaluation, provide pt/family education and to maximize pt's level of independence in the home and community environment.     Patient's spiritual, cultural, and educational needs considered and patient agreeable to plan of care and goals.     Plan  Continue plan " of care as established.       MARCIN Lanier-ARMANDO  03/26/2025

## 2025-03-31 ENCOUNTER — CLINICAL SUPPORT (OUTPATIENT)
Dept: REHABILITATION | Facility: HOSPITAL | Age: 65
End: 2025-03-31
Payer: MEDICARE

## 2025-03-31 DIAGNOSIS — R47.01 EXPRESSIVE APHASIA: Primary | ICD-10-CM

## 2025-03-31 DIAGNOSIS — R48.2 APRAXIA: ICD-10-CM

## 2025-03-31 PROCEDURE — 92507 TX SP LANG VOICE COMM INDIV: CPT | Mod: PO

## 2025-03-31 NOTE — PROGRESS NOTES
Outpatient Rehab    Speech-Language Pathology Visit    Patient Name: Michael Dailey  MRN: [id number]  YOB: 1960  Today's Date: 3/31/2025    Therapy Diagnosis:   Encounter Diagnoses   Name Primary?    Expressive aphasia Yes    Apraxia          Physician: Ivonne Cortes MD    Physician Orders: Eval and Treat  Medical Diagnosis: Physician Orders: Eval and Treat  Medical Diagnosis: Dysarthria [R47.1]    Visit # / Visits Authorized:  6 / 20   Date of Evaluation:  2/13/2025   Insurance Authorization Period: 2/10/2025 to 12/31/2025  Plan of Care Certification: 2/13/2025 to 5/8/2025       Time In: 1:01  Time Out: 1:45  Total Time: 44  Total Billable Time: 44    Subjective        Patient reports sleeping well and showed up in pleasant spirits.  Objective          Treatment  Short Term Goals: (10 weeks) Current Progress:   Patient will complete subtests of the WAB to further characterize aphasia and establish goals    Progressing/ Not Met 3/31/2025      Goal met 2/21/25          2. Patient will provide 3+ words to describe a given picture/object at 80% success given min cues for initiation/word retrieval      Progressing/ Not Met 3/31/2025   - Patient able to provide 5+ words/phrases to describe pictures with 95% accuracy and minimal cues     Met x1     3. Patient will imitate words of increasing syllable length, starting with single-syllable words and progressing to multi-syllabic words, with 80% accuracy    Progressing/ Not Met 3/31/2025   -Not addressed in today's session.       4. Patient will expressively produce 3+ word phrase to state object function with 80% acc given initial phoneme cue as needed across 3 sessions.      Progressing/ Not Met 3/31/2025   - Not addressed in today's session.      Met x1       5. Patient will complete divided attention tasks in natural environment with 90% accuracy independently.     Progressing/ Not Met 3/31/2025   - Patient completed a divided attention       Met x1     6.  " Patient will respond accurately to simple "wh" questions for one word response with 90% acc      Progressing/ Not Met 3/31/2025   - Not addressed in today's session.      Met x2     7.  Patient will generate 10-12 items from an abstract category with a time constraint of 1 minute.     Progressing/ Not Met 3/31/2025   - Pt generated items from below categories within 1 minute time constraint:     - 5 fruits  - 7vegetables    Without time constraint and with moderate-max verbal and visual cues patient able to name   - 10 tools            Assessment & Plan   Assessment  Patient participated well in today's session focused on verbal expression. No pain reported today's date. Patient able to describe pictures provided with 5+ words and used occasional word finding strategies with prompt from clinician. Patient's strengths this date include his ability to describe pictures using phrases with occasional sentences. Demonstrated continuous difficulty generating items within an abstract category today. Provided cues to visualize where items from the given category could be found and mentally navigate through aisles to aid recall. Patient has an increase when given no time constraint to generate items. Goals to be updated as necessary. See above for new added goals. Patient prognosis is Good. Patient will continue to benefit from skilled outpatient speech and language therapy to address the deficits listed in the problem list on initial evaluation, provide patient/family education and to maximize patient's level of independence in the home and community environment.     Patient will continue to benefit from skilled outpatient speech therapy to address the deficits listed in the problem list box on initial evaluation, provide pt/family education and to maximize pt's level of independence in the home and community environment.     Patient's spiritual, cultural, and educational needs considered and patient agreeable to plan of " care and goals.     Plan  Continue plan of care as established.       MARCIN Lanier-ARMANDO  03/31/2025

## 2025-04-02 ENCOUNTER — CLINICAL SUPPORT (OUTPATIENT)
Dept: REHABILITATION | Facility: HOSPITAL | Age: 65
End: 2025-04-02
Payer: MEDICARE

## 2025-04-02 DIAGNOSIS — R47.01 EXPRESSIVE APHASIA: Primary | ICD-10-CM

## 2025-04-02 DIAGNOSIS — R48.2 APRAXIA: ICD-10-CM

## 2025-04-02 PROCEDURE — 92507 TX SP LANG VOICE COMM INDIV: CPT | Mod: PO

## 2025-04-02 NOTE — PROGRESS NOTES
Outpatient Rehab    Speech-Language Pathology Visit    Patient Name: Michael Dailey  MRN: [id number]  YOB: 1960  Today's Date: 4/2/2025    Therapy Diagnosis:   Encounter Diagnoses   Name Primary?    Expressive aphasia Yes    Apraxia          Physician: Ivonne Cortes MD    Physician Orders: Eval and Treat  Medical Diagnosis: Physician Orders: Eval and Treat  Medical Diagnosis: Dysarthria [R47.1]    Visit # / Visits Authorized:  7 / 20   Date of Evaluation:  2/13/2025   Insurance Authorization Period: 2/10/2025 to 12/31/2025  Plan of Care Certification: 2/13/2025 to 5/8/2025       Time In: 10:15  Time Out: 11:00  Total Time: 45  Total Billable Time: 45    Subjective        Patient reports sleeping well and showed up in pleasant spirits.  Objective          Treatment  Short Term Goals: (10 weeks) Current Progress:   Patient will complete subtests of the WAB to further characterize aphasia and establish goals    Progressing/ Not Met 4/2/2025      Goal met          2. Patient will provide 3+ words to describe a given picture/object at 80% success given min cues for initiation/word retrieval      Progressing/ Not Met 4/2/2025   - Not addressed in today's session.       Met x1     3. Patient will imitate words of increasing syllable length, starting with single-syllable words and progressing to multi-syllabic words, with 80% accuracy    Progressing/ Not Met 4/2/2025   -Not addressed in today's session.       4. Patient will expressively produce 3+ word phrase to state object function with 80% acc given initial phoneme cue as needed across 3 sessions.      Progressing/ Not Met 4/2/2025   - Not addressed in today's session.      Met x1       5. Patient will complete divided attention tasks in natural environment with 90% accuracy independently.     Progressing/ Not Met 4/2/2025   - Patient completed a divided attention task requiring him to simultaneously process multiple pieces of information while looking at  "medicine labels, their organization, and any errors found. Pt completed task with 100% accuracy and minimal cues.       Met x2     6.  Patient will respond accurately to simple "wh" questions for one word response with 90% acc      Progressing/ Not Met 4/2/2025   - Patient able to respond accurately to simple 'wh' questions with 90% accuracy     -Some difficulty with 'why' questions requiring initial phoneme cues and carrier phrases this date    Met x2     7.  Patient will generate 10-12 items from an abstract category with a time constraint of 1 minute.     Progressing/ Not Met 4/2/2025   - Pt generated items from below categories within 1 minute time constraint:     - 11 Items found in kitchen     - 3 fruits independently; 7 fruits with mod cues and no time constraint    - 8 vegetables independently; 6 vegetables mod-max cues and no time constraint         Assessment & Plan   Assessment  Patient participated well in today's session focused on verbal expression. No pain reported today's date. Demonstrated consistent difficulty generating items within an abstract category today. Patient reports trying to visualize items from the given category to aid recall. When given broad category, patient accuracy increased. Patient has an increase in generating items when given no time constraint to generate items. Patient participated in a medication management task requiring divided attention, including reading prescription labels, identifying pillbox errors, and adjusting dosages. The task required the patient to simultaneously process multiple pieces of information. Able to complete the task successfully with minimal cues. Goals to be updated as necessary. See above for new added goals. Patient prognosis is Good. Patient will continue to benefit from skilled outpatient speech and language therapy to address the deficits listed in the problem list on initial evaluation, provide patient/family education and to maximize " patient's level of independence in the home and community environment.     Patient will continue to benefit from skilled outpatient speech therapy to address the deficits listed in the problem list box on initial evaluation, provide pt/family education and to maximize pt's level of independence in the home and community environment.     Patient's spiritual, cultural, and educational needs considered and patient agreeable to plan of care and goals.     Plan  Continue plan of care as established.       MARCIN Lanier-SLP  04/02/2025

## 2025-04-07 ENCOUNTER — CLINICAL SUPPORT (OUTPATIENT)
Dept: REHABILITATION | Facility: HOSPITAL | Age: 65
End: 2025-04-07
Payer: MEDICARE

## 2025-04-07 DIAGNOSIS — R47.01 EXPRESSIVE APHASIA: Primary | ICD-10-CM

## 2025-04-07 DIAGNOSIS — R48.2 APRAXIA: ICD-10-CM

## 2025-04-07 PROCEDURE — 92507 TX SP LANG VOICE COMM INDIV: CPT | Mod: PO

## 2025-04-07 NOTE — PROGRESS NOTES
"  Outpatient Rehab    Speech-Language Pathology Visit    Patient Name: Michael Dailey  MRN: [id number]  YOB: 1960  Today's Date: 4/7/2025    Therapy Diagnosis:   Encounter Diagnoses   Name Primary?    Expressive aphasia Yes    Apraxia          Physician: Ivonne Cortes MD    Physician Orders: Eval and Treat  Medical Diagnosis: Physician Orders: Eval and Treat  Medical Diagnosis: Dysarthria [R47.1]    Visit # / Visits Authorized:  8/ 20   Date of Evaluation:  2/13/2025   Insurance Authorization Period: 2/10/2025 to 12/31/2025  Plan of Care Certification: 2/13/2025 to 5/8/2025       Time In: 10:15  Time Out: 11:00  Total Time: 45  Total Billable Time: 45    Subjective        Patient arrived on time and showed up in pleasant spirits.  Objective          Treatment  Short Term Goals: (10 weeks) Current Progress:   Patient will complete subtests of the WAB to further characterize aphasia and establish goals    Progressing/ Not Met 4/7/2025      Goal met          2. Patient will provide 3+ words to describe a given picture/object at 80% success given min cues for initiation/word retrieval      Progressing/ Not Met 4/7/2025   - Not addressed in today's session.       Met x1     3. Patient will imitate words of increasing syllable length, starting with single-syllable words and progressing to multi-syllabic words, with 80% accuracy    Progressing/ Not Met 4/7/2025   -Not addressed in today's session.       4. Patient will expressively produce 3+ word phrase to state object function with 80% acc given initial phoneme cue as needed across 3 sessions.      Progressing/ Not Met 4/7/2025   - Not addressed in today's session.      Met x1       5. Patient will complete divided attention tasks in natural environment with 90% accuracy independently.     Progressing/ Not Met 4/7/2025   - Not addressed in today's session.      Met x2     6.  Patient will respond accurately to simple "wh" questions for one word response with " 90% acc      Progressing/ Not Met 4/7/2025   - Patient able to respond accurately to simple 'wh' questions with 90% accuracy     -Less difficulty with 'why' questions; however, required initial phoneme cues and carrier phrases as needed    Met x3 4/7/25     7.  Patient will generate 10-12 items from an abstract category with a time constraint of 1 minute.     Progressing/ Not Met 4/7/2025   - Pt generated items from below categories within 1 minute time constraint:     - 7 types of clothing independently      - 5 vegetables independently; 7 total vegetables mod-max cues and no time constraint    -Items found in a garage 3 independently; 8 total with mod-max cues and no time constraint         Assessment & Plan   Assessment  Patient participated well in today's session focused on verbal expression. No pain reported today's date. Demonstrated consistent difficulty generating items within an abstract/concrete category today. Patient has an increase in generating items when given no time constraint to verbally produce items. Patient benefits from extended time to come up with target words. Patient successfully answered 'wh' questions this date; less difficulty with 'why' questions. Handout provided today outlining a structured template to support word-finding (description), including prompts such as smell, taste, sound, and visual characteristics. Education was given to both the patient and spouse using this handout to support carryover of strategy. Goals to be updated as necessary. Patient prognosis is Good. Patient will continue to benefit from skilled outpatient speech and language therapy to address the deficits listed in the problem list on initial evaluation, provide patient/family education and to maximize patient's level of independence in the home and community environment.     Patient will continue to benefit from skilled outpatient speech therapy to address the deficits listed in the problem list box on  initial evaluation, provide pt/family education and to maximize pt's level of independence in the home and community environment.     Patient's spiritual, cultural, and educational needs considered and patient agreeable to plan of care and goals.     Plan  Continue plan of care as established.       MARCIN Lanier-SLP  04/07/2025

## 2025-04-09 ENCOUNTER — CLINICAL SUPPORT (OUTPATIENT)
Dept: REHABILITATION | Facility: HOSPITAL | Age: 65
End: 2025-04-09
Payer: MEDICARE

## 2025-04-09 DIAGNOSIS — R47.01 EXPRESSIVE APHASIA: Primary | ICD-10-CM

## 2025-04-09 DIAGNOSIS — R48.2 APRAXIA: ICD-10-CM

## 2025-04-09 PROCEDURE — 92507 TX SP LANG VOICE COMM INDIV: CPT | Mod: PO

## 2025-04-09 NOTE — PROGRESS NOTES
Outpatient Rehab    Speech-Language Pathology Progress Note : Updated Plan of Care    Patient Name: Michael Dailey  MRN: [id number]  YOB: 1960  Encounter Date: 4/9/2025    Therapy Diagnosis:   Encounter Diagnoses   Name Primary?    Expressive aphasia Yes    Apraxia      Physician: Ivonne Cortes MD    Physician Orders: Eval and Treat  Medical Diagnosis: Dysarthria    Visit # / Visits Authorized: 9 / 20   Insurance Authorization Period: 2/10/2025 to 12/31/2025  Date of Evaluation:  2/13/2025   Plan of Care Certification: 4/9/2025 to 5/8/2025       Time In: 1015   Time Out: 1100  Total Time: 45   Total Billable Time: 45  Subjective   Patient arrived on time and in pleasant spirits.         Objective             Treatment  Short Term Goals: (10 weeks) Current Progress:   Patient will complete subtests of the WAB to further characterize aphasia and establish goals    Progressing/ Not Met 4/9/2025      Goal met          2. Patient will provide 3+ words to describe a given picture/object at 80% success given min cues for initiation/word retrieval      Progressing/ Not Met 4/9/2025   - Patient provided 3-5 word phrases to describe pictures to clinician using visual aid for word-finding; completed task with 85% accuracy and minimal cues       Met x2     3. Patient will imitate words of increasing syllable length, starting with single-syllable words and progressing to multi-syllabic words, with 80% accuracy    Progressing/ Not Met 4/9/2025   -Patient imitated 1-5 syllable words using Apraxia Lite vinnie; patient produced words with 85% accuracy and minimal cues        4. Patient will expressively produce 3+ word phrase to state object function with 80% acc given initial phoneme cue as needed across 3 sessions.      Progressing/ Not Met 4/9/2025   - Not addressed in today's session.      Met x1       5. Patient will complete divided attention tasks in natural environment with 90% accuracy independently.  "    Progressing/ Not Met 4/9/2025   - Not addressed in today's session.      Met x2     6.  Patient will respond accurately to simple "wh" questions for one word response with 90% acc      Progressing/ Not Met 4/9/2025     Met x3 4/7/25     7.  Patient will generate 10-12 items from an abstract category with a time constraint of 1 minute.     Progressing/ Not Met 4/9/2025   - Not addressed in today's session.           Time Entry(in minutes):  Speech Treatment (Individual) Time Entry: 45    Assessment & Plan     Patient participated well in today's session focused on verbal expression. Patient described pictures to clinician using 3-5 word phrases successfully this date. Patient benefitted from word-finding and description category handout for visual aid. Imitated words increasing in length using Apraxia Swift Shifte vinnie. Patient had to self-rate and when self-rating was low pt repeated word again. Strength this date was patient's self-awareness during tasks. Pt will continue to benefit from skilled ST intervention to improve expressive language and motor planning for speech. He is progressing well toward his goals and remains highly motivated to enhance his communication abilities. He continues to demonstrate deficits in word retrieval, sentence formulation, and speech motor coordination. However, he is actively implementing compensatory strategies at home. Current goals remain appropriate and functional.     Plan  From a speech language pathology perspective, the patient would benefit from: Skilled Rehab Services  Planned therapy interventions and modalities include: Speech/language therapy, Patient/family education, and Home program instruction.        Goals added to updated plan of care based on completion of WAB evaluation.    Visit Frequency: 2 times Per Week for 4 Weeks.     This plan was discussed with Patient and Caregiver.     Plan details: Continue plan of care for skilled speech therapy services    Patient will " "continue to benefit from skilled outpatient speech therapy to address the deficits listed in the problem list box on initial evaluation, provide pt/family education and to maximize pt's level of independence in the home and community environment.     Patient's spiritual, cultural, and educational needs considered and patient agreeable to plan of care and goals.     Goals:   Active       1. Short term goals       Stg 1       Start:  04/09/25    Expected End:  05/08/25       Patient will provide 3+ words to describe a given picture/object at 80% success given min cues for initiation/word retrieval           Stg 2       Start:  04/09/25    Expected End:  05/08/25       Patient will imitate words of increasing syllable length, starting with single-syllable words and progressing to multi-syllabic words, with 80% accuracy         Stg 3       Start:  04/09/25    Expected End:  05/08/25       Patient will expressively produce 3+ word phrase to state object function with 80% acc given initial phoneme cue as needed across 3 sessions.         Stg 4       Start:  04/09/25    Expected End:  05/08/25       Patient will complete divided attention tasks in natural environment with 90% accuracy independently.         Stg 5       Start:  04/09/25    Expected End:  05/08/25       Patient will respond accurately to simple "wh" questions for one word response with 90% acc              Long Term Goals       LTG 1  (Progressing)       Start:  02/16/25    Expected End:  05/08/25       Pt will develop functional expressive language skills to communicate wants,needs, and emotions effectively to variety of communication partners in medical and home environments         LTG 2  (Progressing)       Start:  02/16/25    Expected End:  05/08/25       Pt will participate in ongoing assessment of cognitive-linguistic skills.            Resolved       Short Term Goals        STG 1 (Met)       Start:  02/16/25    Expected End:  03/30/25    Resolved:  " 04/09/25    Patient will complete subtests of the WAB to further characterize aphasia and establish goals              MARCIN Lanier-ARMANDO

## 2025-04-14 ENCOUNTER — CLINICAL SUPPORT (OUTPATIENT)
Dept: REHABILITATION | Facility: HOSPITAL | Age: 65
End: 2025-04-14
Payer: MEDICARE

## 2025-04-14 DIAGNOSIS — R47.01 EXPRESSIVE APHASIA: Primary | ICD-10-CM

## 2025-04-14 DIAGNOSIS — R48.2 APRAXIA: ICD-10-CM

## 2025-04-14 PROCEDURE — 92507 TX SP LANG VOICE COMM INDIV: CPT | Mod: PO

## 2025-04-14 NOTE — PROGRESS NOTES
Outpatient Rehab    Speech-Language Pathology Visit    Patient Name: Michael Dailey  MRN: [id number]  YOB: 1960  Encounter Date: 4/14/2025    Therapy Diagnosis:   Encounter Diagnoses   Name Primary?    Expressive aphasia Yes    Apraxia      Physician: Ivonne Cortes MD    Physician Orders: Eval and Treat  Medical Diagnosis: Dysarthria    Visit # / Visits Authorized: 10 / 20   Insurance Authorization Period: 2/10/2025 to 12/31/2025  Date of Evaluation:  2/13/2025   Plan of Care Certification: 2/13/2025 to 5/8/2025       Time In: 1015   Time Out: 1100  Total Time: 45   Total Billable Time: 45     Subjective   Patient reports no pain and sleeping well.         Objective          Treatment     Short Term Goals: (10 weeks) Current Progress:   Patient will complete subtests of the WAB to further characterize aphasia and establish goals    Progressing/ Not Met 4/9/2025      Goal met          2. Patient will provide 3+ words to describe a given picture/object at 80% success given min cues for initiation/word retrieval      Progressing/ Not Met 4/9/2025   - Not addressed in today's session.         Met x2     3. Patient will imitate words of increasing syllable length, starting with single-syllable words and progressing to multi-syllabic words, with 80% accuracy    Progressing/ Not Met 4/9/2025   -Patient imitated 1-5 syllable words with 80% combined accuracy this date and minimal cues    -More difficulty with 4-5 syllable words due to AOS    Met x1   4. Patient will expressively produce 3+ word phrase to state object function with 80% acc given initial phoneme cue as needed across 3 sessions.      Progressing/ Not Met 4/9/2025   - Patient used 3+ word phrase to state object function with initial phoneme provided as needed and moderate clinician cues    -Patient required carrier phrases occasionally    Met x1       5. Patient will complete divided attention tasks in natural environment with 90% accuracy  "independently.     Progressing/ Not Met 4/9/2025   - Patient did automatic speech tasks while throwing a ball back and forth between himself and clinician; patient had difficulty on occasion, causing him to pause with the ball in his hand; reduced noise this date and minimal cues      Met x2     6.  Patient will respond accurately to simple "wh" questions for one word response with 90% acc      Progressing/ Not Met 4/9/2025     Goal Met     7.  Patient will generate 10-12 items from an abstract category with a time constraint of 1 minute.     Progressing/ Not Met 4/9/2025   - Restaurants: 3 with prompts for using alphabet to aid in word fluency with one minute time constraint    - No time constraint with visual aid alphabet for restaurants: 5 independently, remaining 21 letters with max cues            Time Entry(in minutes):  Speech Treatment (Individual) Time Entry: 45    Assessment & Plan   Assessment  Patient participated well in today's session focused on verbal expression and divided attention. Patient imitated words increasing in length of 1-5 syllables. More difficulty with 4-5 syllables words due to AOS. Strength this date was patient's ability to self-correct during tasks.Pt benefits from extended time to find and verbalize target word. Divided attention targeted during automatic speech tasks, which provided no difficulty for pt; however, adding in another activity was somewhat difficult for pt to complete. He is progressing well toward his goals and remains highly motivated to enhance his communication abilities. Current goals remain appropriate and functional.  Evaluation/Treatment Tolerance: Patient tolerated treatment well    Patient will continue to benefit from skilled outpatient speech therapy to address the deficits listed in the problem list box on initial evaluation, provide pt/family education and to maximize pt's level of independence in the home and community environment.     Patient's " "spiritual, cultural, and educational needs considered and patient agreeable to plan of care and goals.          Plan  Continue ST POC          Goals:   Active       1. Short term goals       Stg 1 (Progressing)       Start:  04/09/25    Expected End:  05/08/25       Patient will provide 3+ words to describe a given picture/object at 80% success given min cues for initiation/word retrieval           Stg 2 (Progressing)       Start:  04/09/25    Expected End:  05/08/25       Patient will imitate words of increasing syllable length, starting with single-syllable words and progressing to multi-syllabic words, with 80% accuracy         Stg 3 (Progressing)       Start:  04/09/25    Expected End:  05/08/25       Patient will expressively produce 3+ word phrase to state object function with 80% acc given initial phoneme cue as needed across 3 sessions.         Stg 4 (Progressing)       Start:  04/09/25    Expected End:  05/08/25       Patient will complete divided attention tasks in natural environment with 90% accuracy independently.         Stg 5 (Met)       Start:  04/09/25    Expected End:  05/08/25    Resolved:  04/14/25    Patient will respond accurately to simple "wh" questions for one word response with 90% acc              Long Term Goals       LTG 1  (Progressing)       Start:  02/16/25    Expected End:  05/08/25       Pt will develop functional expressive language skills to communicate wants,needs, and emotions effectively to variety of communication partners in medical and home environments         LTG 2  (Progressing)       Start:  02/16/25    Expected End:  05/08/25       Pt will participate in ongoing assessment of cognitive-linguistic skills.            Resolved       Short Term Goals        STG 1 (Met)       Start:  02/16/25    Expected End:  03/30/25    Resolved:  04/09/25    Patient will complete subtests of the WAB to further characterize aphasia and establish goals              YSESI Lanier  "

## 2025-04-16 ENCOUNTER — CLINICAL SUPPORT (OUTPATIENT)
Dept: REHABILITATION | Facility: HOSPITAL | Age: 65
End: 2025-04-16
Payer: MEDICARE

## 2025-04-16 DIAGNOSIS — R48.2 APRAXIA: ICD-10-CM

## 2025-04-16 DIAGNOSIS — R47.01 EXPRESSIVE APHASIA: Primary | ICD-10-CM

## 2025-04-16 PROCEDURE — 92507 TX SP LANG VOICE COMM INDIV: CPT | Mod: PO

## 2025-04-16 NOTE — PROGRESS NOTES
"  Outpatient Rehab    Speech-Language Pathology Visit    Patient Name: Michael Dailey  MRN: [id number]  YOB: 1960  Encounter Date: 4/16/2025    Therapy Diagnosis:   Encounter Diagnoses   Name Primary?    Expressive aphasia Yes    Apraxia      Physician: Ivonne Cortes MD    Physician Orders: Eval and Treat  Medical Diagnosis: Dysarthria    Visit # / Visits Authorized: 11 / 20   Insurance Authorization Period: 2/10/2025 to 12/31/2025  Date of Evaluation:  2/13/2025   Plan of Care Certification: 2/13/2025 to 5/8/2025       Time In: 1015   Time Out: 1100  Total Time: 45   Total Billable Time: 45     Subjective   Patient arrived on time and in pleasant spirits.         Objective          Treatment     Short Term Goals: (10 weeks) Current Progress:   Patient will complete subtests of the WAB to further characterize aphasia and establish goals    Progressing/ Not Met 4/9/2025      Goal met          2. Patient will provide 3+ words to describe a given picture/object at 80% success given min cues for initiation/word retrieval      Progressing/ Not Met 4/9/2025   - Not addressed in today's session.         Met x2     3. Patient will imitate words of increasing syllable length, starting with single-syllable words and progressing to multi-syllabic words, with 80% accuracy    Progressing/ Not Met 4/9/2025   -Not addressed in today's session.      Met x1   4. Patient will expressively produce 3+ word phrase to state object function with 80% acc given initial phoneme cue as needed across 3 sessions.      Progressing/ Not Met 4/9/2025   - Not addressed in today's session.      Met x1       5. Patient will complete divided attention tasks in natural environment with 90% accuracy independently.     Progressing/ Not Met 4/9/2025   - Patient did cancellation task while answering "get to know me" questions this date (verbal-motor task); patient paused frequently to answering questions requiring moderate cues required to get " "back on task     - Patient completed a rule-switching divided attention task ("clap for odd, snap for even") with moderate visual and verbal cues, achieving 85% accuracy.      Met x2     6.  Patient will respond accurately to simple "wh" questions for one word response with 90% acc      Progressing/ Not Met 4/9/2025     Goal Met     7.  Patient will generate 10-12 items from an abstract category with a time constraint of 1 minute.     Progressing/ Not Met 4/9/2025   - Not addressed in today's session.             Time Entry(in minutes):  Speech Treatment (Individual) Time Entry: 45    Assessment & Plan   Assessment  Patient participated well in today's session focused on attention. Session spent completing divided attention tasks. Patient demonstrated difficulty with divided attention, requiring frequent redirection and external supports to complete the task accurately. He benefits from extended time completing tasks and breaks throughout session. He is progressing well toward his goals and remains highly motivated to enhance his communication abilities. Current goals remain appropriate and functional.  Evaluation/Treatment Tolerance: Patient tolerated treatment well    Patient will continue to benefit from skilled outpatient speech therapy to address the deficits listed in the problem list box on initial evaluation, provide pt/family education and to maximize pt's level of independence in the home and community environment.     Patient's spiritual, cultural, and educational needs considered and patient agreeable to plan of care and goals.          Plan  Continue ST POC          Goals:   Active       1. Short term goals       Stg 1 (Progressing)       Start:  04/09/25    Expected End:  05/08/25       Patient will provide 3+ words to describe a given picture/object at 80% success given min cues for initiation/word retrieval           Stg 2 (Progressing)       Start:  04/09/25    Expected End:  05/08/25       Patient " "will imitate words of increasing syllable length, starting with single-syllable words and progressing to multi-syllabic words, with 80% accuracy         Stg 3 (Progressing)       Start:  04/09/25    Expected End:  05/08/25       Patient will expressively produce 3+ word phrase to state object function with 80% acc given initial phoneme cue as needed across 3 sessions.         Stg 4 (Progressing)       Start:  04/09/25    Expected End:  05/08/25       Patient will complete divided attention tasks in natural environment with 90% accuracy independently.         Stg 5 (Met)       Start:  04/09/25    Expected End:  05/08/25    Resolved:  04/14/25    Patient will respond accurately to simple "wh" questions for one word response with 90% acc              Long Term Goals       LTG 1  (Progressing)       Start:  02/16/25    Expected End:  05/08/25       Pt will develop functional expressive language skills to communicate wants,needs, and emotions effectively to variety of communication partners in medical and home environments         LTG 2  (Progressing)       Start:  02/16/25    Expected End:  05/08/25       Pt will participate in ongoing assessment of cognitive-linguistic skills.            Resolved       Short Term Goals        STG 1 (Met)       Start:  02/16/25    Expected End:  03/30/25    Resolved:  04/09/25    Patient will complete subtests of the WAB to further characterize aphasia and establish goals              MARCIN Lanier-YESSI Mcgrath    "

## 2025-04-23 ENCOUNTER — CLINICAL SUPPORT (OUTPATIENT)
Dept: REHABILITATION | Facility: HOSPITAL | Age: 65
End: 2025-04-23
Payer: MEDICARE

## 2025-04-23 DIAGNOSIS — R48.2 APRAXIA: ICD-10-CM

## 2025-04-23 DIAGNOSIS — R47.01 EXPRESSIVE APHASIA: Primary | ICD-10-CM

## 2025-04-23 PROCEDURE — 92507 TX SP LANG VOICE COMM INDIV: CPT | Mod: PO

## 2025-04-23 NOTE — PROGRESS NOTES
Outpatient Rehab    Speech-Language Pathology Visit    Patient Name: Michael Dailey  MRN: [id number]  YOB: 1960  Encounter Date: 4/23/2025    Therapy Diagnosis:   Encounter Diagnoses   Name Primary?    Expressive aphasia Yes    Apraxia        Physician: Ivonne Cortes MD    Physician Orders: Eval and Treat  Medical Diagnosis: Dysarthria    Visit # / Visits Authorized: 12 / 20   Insurance Authorization Period: 2/10/2025 to 12/31/2025  Date of Evaluation:  2/13/2025   Plan of Care Certification: 2/13/2025 to 5/8/2025       Time In: 1015   Time Out: 1100  Total Time: 45   Total Billable Time: 45     Subjective   Patient reports no pain and arrived in pleasant spirits.         Objective          Treatment     Short Term Goals: (10 weeks) Current Progress:   Patient will complete subtests of the WAB to further characterize aphasia and establish goals    Progressing/ Not Met 4/9/2025      Goal met          2. Patient will provide 3+ words to describe a given picture/object at 80% success given min cues for initiation/word retrieval      Progressing/ Not Met 4/9/2025   - Not addressed in today's session.         Met x2     3. Patient will imitate words of increasing syllable length, starting with single-syllable words and progressing to multi-syllabic words, with 80% accuracy    Progressing/ Not Met 4/9/2025   -Not addressed in today's session.      Met x1   4. Patient will expressively produce 3+ word phrase to state object function with 80% acc given initial phoneme cue as needed across 3 sessions.      Progressing/ Not Met 4/9/2025   - Not addressed in today's session.      Met x1       5. Patient will complete divided attention tasks in natural environment with 90% accuracy independently.     Progressing/ Not Met 4/9/2025   - Patient completed divided attention task requiring him to recite the alphabet while generating a persons name for each letter in a natural noise environment with 80% accuracy and  "moderate cues    Met x2     6.  Patient will respond accurately to simple "wh" questions for one word response with 90% acc      Progressing/ Not Met 4/9/2025     Goal Met     7.  Patient will generate 10-12 items from an abstract category with a time constraint of 1 minute.     Progressing/ Not Met 4/9/2025   - Indirectly addressed through divided attention goal this date; patient had difficulty generating names using alphabet as an aid; required moderate cues         Time Entry(in minutes):  Speech Treatment (Individual) Time Entry: 45    Assessment & Plan   Assessment  Patient participated well in today's session focused on attention. Session spent completing divided attention tasks. Patient demonstrated difficulty with divided attention, requiring frequent cues.At times, the patient had difficulty staying on task while reciting the alphabet. When asked about the difficulty, the patient gestured to his head, indicating challenges organizing his thoughts. Thought organization strategies handout and education given today. Indirectly addressed verbal fluency goal, with no time constraint. He benefits from extended time completing tasks and breaks throughout session. He is progressing well toward his goals and remains highly motivated to enhance his communication abilities. Current goals remain appropriate and functional.  Evaluation/Treatment Tolerance: Patient tolerated treatment well    Patient will continue to benefit from skilled outpatient speech therapy to address the deficits listed in the problem list box on initial evaluation, provide pt/family education and to maximize pt's level of independence in the home and community environment.     Patient's spiritual, cultural, and educational needs considered and patient agreeable to plan of care and goals.          Plan  Continue ST POC          Goals:   Active       1. Short term goals       Stg 1 (Progressing)       Start:  04/09/25    Expected End:  05/08/25    " "   Patient will provide 3+ words to describe a given picture/object at 80% success given min cues for initiation/word retrieval           Stg 2 (Progressing)       Start:  04/09/25    Expected End:  05/08/25       Patient will imitate words of increasing syllable length, starting with single-syllable words and progressing to multi-syllabic words, with 80% accuracy         Stg 3 (Progressing)       Start:  04/09/25    Expected End:  05/08/25       Patient will expressively produce 3+ word phrase to state object function with 80% acc given initial phoneme cue as needed across 3 sessions.         Stg 4 (Progressing)       Start:  04/09/25    Expected End:  05/08/25       Patient will complete divided attention tasks in natural environment with 90% accuracy independently.         Stg 5 (Met)       Start:  04/09/25    Expected End:  05/08/25    Resolved:  04/14/25    Patient will respond accurately to simple "wh" questions for one word response with 90% acc              Long Term Goals       LTG 1  (Progressing)       Start:  02/16/25    Expected End:  05/08/25       Pt will develop functional expressive language skills to communicate wants,needs, and emotions effectively to variety of communication partners in medical and home environments         LTG 2  (Progressing)       Start:  02/16/25    Expected End:  05/08/25       Pt will participate in ongoing assessment of cognitive-linguistic skills.            Resolved       Short Term Goals        STG 1 (Met)       Start:  02/16/25    Expected End:  03/30/25    Resolved:  04/09/25    Patient will complete subtests of the WAB to further characterize aphasia and establish goals                YESSI Lanier      "

## 2025-04-28 ENCOUNTER — CLINICAL SUPPORT (OUTPATIENT)
Dept: REHABILITATION | Facility: HOSPITAL | Age: 65
End: 2025-04-28
Payer: MEDICARE

## 2025-04-28 DIAGNOSIS — R47.01 EXPRESSIVE APHASIA: Primary | ICD-10-CM

## 2025-04-28 DIAGNOSIS — R48.2 APRAXIA: ICD-10-CM

## 2025-04-28 PROCEDURE — 92507 TX SP LANG VOICE COMM INDIV: CPT | Mod: PO

## 2025-04-28 NOTE — PROGRESS NOTES
Outpatient Rehab    Speech-Language Pathology Visit    Patient Name: Michael Dailey  MRN: [id number]  YOB: 1960  Encounter Date: 4/28/2025    Therapy Diagnosis:   Encounter Diagnoses   Name Primary?    Expressive aphasia Yes    Apraxia        Physician: Ivonne Cortes MD    Physician Orders: Eval and Treat  Medical Diagnosis: Dysarthria    Visit # / Visits Authorized: 13 / 20   Insurance Authorization Period: 2/10/2025 to 12/31/2025  Date of Evaluation:  2/13/2025   Plan of Care Certification: 2/13/2025 to 5/8/2025       Time In: 1016   Time Out: 1100  Total Time: 44   Total Billable Time: 44     Subjective   Pt reports nausea; however, showed up in pleasant spirits.  Pain reported as 4/10. Nausea and dizziness; pt's blood pressure was taken - 136/79    Objective          Treatment     Short Term Goals: (10 weeks) Current Progress:   Patient will complete subtests of the WAB to further characterize aphasia and establish goals    Progressing/ Not Met 4/9/2025      Goal met          2. Patient will provide 3+ words to describe a given picture/object at 80% success given min cues for initiation/word retrieval      Progressing/ Not Met 4/9/2025   - Pt able to consistently use 3+ word phrases to describe pictures unknown to clinician with 85% accuracy and minimal cues      Goal Met 4/28/25     3. Patient will imitate words of increasing syllable length, starting with single-syllable words and progressing to multi-syllabic words, with 80% accuracy    Progressing/ Not Met 4/9/2025   -Not addressed in today's session.      Met x1   4. Patient will expressively produce 3+ word phrase to state object function with 80% acc given initial phoneme cue as needed across 3 sessions.      Progressing/ Not Met 4/9/2025   - Not addressed in today's session.      Met x1       5. Patient will complete divided attention tasks in natural environment with 90% accuracy independently.     Progressing/ Not Met 4/9/2025   -  "Patient completed a divided attention task requiring identification of the color and odd/even status of a playing card presented in front of him, in a natural noise environment, with 80% accuracy given moderate cues    Met x2     6.  Patient will respond accurately to simple "wh" questions for one word response with 90% acc      Progressing/ Not Met 4/9/2025     Goal Met     7.  Patient will generate 10-12 items from an abstract category with a time constraint of 1 minute.     Progressing/ Not Met 4/9/2025   - Indirectly addressed through divided attention goal this date; patient had difficulty generating names using alphabet as an aid; required moderate cues         Time Entry(in minutes):  Speech Treatment (Individual) Time Entry: 44    Assessment & Plan   Assessment  Patient participated well in today's session focused on verbal expression and attention. Majority of session spent completing divided attention tasks. Patient demonstrated difficulty with divided attention, requiring frequent cues towards end of task. Cognitive fatigue could have played a factor due to task being at end of session. Patient consistently used 3-6 word phrases/sentences to describe pictures unknown to clinician. Pt benefits from extended time completing tasks and breaks throughout session. He is progressing well toward his goals and remains highly motivated to enhance his communication abilities. Current goals remain appropriate and functional.  Evaluation/Treatment Tolerance: Patient tolerated treatment well    Patient will continue to benefit from skilled outpatient speech therapy to address the deficits listed in the problem list box on initial evaluation, provide pt/family education and to maximize pt's level of independence in the home and community environment.     Patient's spiritual, cultural, and educational needs considered and patient agreeable to plan of care and goals.          Plan  Continue ST POC          Goals:   Active " "      1. Short term goals       Stg 1 (Met)       Start:  04/09/25    Expected End:  05/08/25    Resolved:  04/28/25    Patient will provide 3+ words to describe a given picture/object at 80% success given min cues for initiation/word retrieval           Stg 2 (Progressing)       Start:  04/09/25    Expected End:  05/08/25       Patient will imitate words of increasing syllable length, starting with single-syllable words and progressing to multi-syllabic words, with 80% accuracy         Stg 3 (Progressing)       Start:  04/09/25    Expected End:  05/08/25       Patient will expressively produce 3+ word phrase to state object function with 80% acc given initial phoneme cue as needed across 3 sessions.         Stg 4 (Progressing)       Start:  04/09/25    Expected End:  05/08/25       Patient will complete divided attention tasks in natural environment with 90% accuracy independently.         Stg 5 (Met)       Start:  04/09/25    Expected End:  05/08/25    Resolved:  04/14/25    Patient will respond accurately to simple "wh" questions for one word response with 90% acc              Long Term Goals       LTG 1  (Progressing)       Start:  02/16/25    Expected End:  05/08/25       Pt will develop functional expressive language skills to communicate wants,needs, and emotions effectively to variety of communication partners in medical and home environments         LTG 2  (Progressing)       Start:  02/16/25    Expected End:  05/08/25       Pt will participate in ongoing assessment of cognitive-linguistic skills.            Resolved       Short Term Goals        STG 1 (Met)       Start:  02/16/25    Expected End:  03/30/25    Resolved:  04/09/25    Patient will complete subtests of the WAB to further characterize aphasia and establish goals                YESSI Lanier        "

## 2025-05-02 DIAGNOSIS — Z86.73 H/O: CVA (CEREBROVASCULAR ACCIDENT): Chronic | ICD-10-CM

## 2025-05-02 DIAGNOSIS — K21.9 GASTROESOPHAGEAL REFLUX DISEASE WITHOUT ESOPHAGITIS: ICD-10-CM

## 2025-05-02 RX ORDER — CARBIDOPA AND LEVODOPA 25; 100 MG/1; MG/1
1 TABLET ORAL 3 TIMES DAILY
Qty: 90 TABLET | Refills: 1 | Status: SHIPPED | OUTPATIENT
Start: 2025-05-02

## 2025-05-02 RX ORDER — ATORVASTATIN CALCIUM 80 MG/1
80 TABLET, FILM COATED ORAL DAILY
Qty: 90 TABLET | Refills: 1 | Status: SHIPPED | OUTPATIENT
Start: 2025-05-02

## 2025-05-02 RX ORDER — CLOPIDOGREL BISULFATE 75 MG/1
75 TABLET ORAL DAILY
Qty: 90 TABLET | Refills: 1 | Status: SHIPPED | OUTPATIENT
Start: 2025-05-02 | End: 2026-05-02

## 2025-05-02 RX ORDER — QUETIAPINE FUMARATE 25 MG/1
25-50 TABLET, FILM COATED ORAL NIGHTLY
Qty: 60 TABLET | Refills: 2 | Status: SHIPPED | OUTPATIENT
Start: 2025-05-02

## 2025-05-02 RX ORDER — FLUOXETINE HYDROCHLORIDE 40 MG/1
40 CAPSULE ORAL DAILY
Qty: 90 CAPSULE | Refills: 1 | Status: SHIPPED | OUTPATIENT
Start: 2025-05-02

## 2025-05-02 RX ORDER — OMEPRAZOLE 40 MG/1
40 CAPSULE, DELAYED RELEASE ORAL DAILY
Qty: 90 CAPSULE | Refills: 1 | Status: SHIPPED | OUTPATIENT
Start: 2025-05-02 | End: 2026-05-02

## 2025-05-02 NOTE — TELEPHONE ENCOUNTER
----- Message from Bronwyn sent at 5/2/2025  1:38 PM CDT -----  Contact: DaughterBridget, [phone]  Requesting an RX refill or new RX.Is this a refill or new RX: RefillRX name and strength (copy/paste from chart):  clopidogreL (PLAVIX) 75 mg tabletIs this a 30 day or 90 day RX: 90Pharmacy name and phone # (copy/paste from chart):  [address], [place] - [address] [facility] [place] [id number] The Political StudentConvertigoMERArtesia General Hospital [place] [address]Fcuxc: [phone] Fax: [phone] doctors have asked that we provide their patients with the following 2 reminders -- prescription refills can take up to 72 hours, and a friendly reminder that in the future you can use your [facility] account to request refills: YesRequesting an RX refill or new RX.Is this a refill or new RX: RefillRX name and strength (copy/paste from chart):  QUEtiapine (SEROQUEL) 25 MG TabIs this a 30 day or 90 day RX: 90Pharmacy name and phone # (copy/paste from chart):  [address], [place] - [address] [facility] [place] [id number] The Political StudentPress-senseVDMERAUX [place] [address]Svtya: [phone] Fax: [phone] doctors have asked that we provide their patients with the following 2 reminders -- prescription refills can take up to 72 hours, and a friendly reminder that in the future you can use your [facility] account to request refills: YesRequesting an RX refill or new RX.Is this a refill or new RX: RefillRX name and strength (copy/paste from chart):  QUEtiapine (SEROQUEL) 50 MG TabIs this a 30 day or 90 day RX: 90Pharmacy name and phone # (copy/paste from chart):  [address] [facility] [place] - [address] The Political StudentOP [place] [id number] The Political StudentPress-senseVDMERAUX [place] [address]Ycxra: [phone] Fax: [phone] doctors have asked that we provide their patients with the following 2 reminders -- prescription refills can take up to 72 hours, and a friendly reminder that in the future you can use your [facility] account to  request refills: YesRequesting an RX refill or new RX.Is this a refill or new RX: RefillRX name and strength (copy/paste from chart):  omeprazole (PRILOSEC) 40 MG capsuleIs this a 30 day or 90 day RX: 90Pharmacy name and phone # (copy/paste from chart):  [address], [place] - [address] [facility]2500 [facility] [place] [facility] [place] [address]Ewybm: [phone] Fax: [phone] doctors have asked that we provide their patients with the following 2 reminders -- prescription refills can take up to 72 hours, and a friendly reminder that in the future you can use your [facility] account to request refills: YesRequesting an RX refill or new RX.Is this a refill or new RX: RefillRX name and strength (copy/paste from chart):  traMADoL (ULTRAM) 50 mg tabletIs this a 30 day or 90 day RX: 90Pharmacy name and phone # (copy/paste from chart):  [address], [place] - [address] [facility] [place] [address]Oxvra: [phone] Fax: [phone] doctors have asked that we provide their patients with the following 2 reminders -- prescription refills can take up to 72 hours, and a friendly reminder that in the future you can use your [facility] account to request refills: YesRequesting an RX refill or new RX.Is this a refill or new RX: RefillRX name and strength (copy/paste from chart):  atorvastatin (LIPITOR) 80 MG tabletIs this a 30 day or 90 day RX: 90Pharmacy name and phone # (copy/paste from chart):  [address], [place] - [address] [facility] [place] [id number] [facility] GNosis AnalyticsGraft[facility] [place] [address]Phjol: [phone] Fax: [phone] doctors have asked that we provide their patients with the following 2 reminders -- prescription refills can take up to 72 hours, and a friendly reminder that in the future you can use your [facility] account to request refills: YesRequesting an RX refill or new RX.Is this a refill or new RX:  RefillRX name and strength (copy/paste from chart):  FLUoxetine 40 MG capsuleIs this a 30 day or 90 day RX: 90Pharmacy name and phone # (copy/paste from chart):  [place] [address] [address] [place] - [address] [facility] CenterJibbigoJORGE [place] [id number] [facility] CenterJibbigoAlizÃ© PharmaBeaumont Hospital [address]Rtndz: [phone] Fax: [phone] doctors have asked that we provide their patients with the following 2 reminders -- prescription refills can take up to 72 hours, and a friendly reminder that in the future you can use your [facility] account to request refills: YesRequesting an RX refill or new RX.Is this a refill or new RX: RefillRX name and strength (copy/paste from chart):  carbidopa-levodopa  mg (SINEMET)  mg per tabletIs this a 30 day or 90 day RX: 90Pharmacy name and phone # (copy/paste from chart):  [address] [place] - [place] [facility] CenterJibbigoeBIZ.mobility [id number] [facility] [employer] [facility] [address]Sgnbr: [phone] Fax: [phone] doctors have asked that we provide their patients with the following 2 reminders -- prescription refills can take up to 72 hours, and a friendly reminder that in the future you can use your [facility] account to request refills: Yes

## 2025-05-02 NOTE — TELEPHONE ENCOUNTER
----- Message from Bronwyn sent at 5/2/2025  1:38 PM CDT -----  Contact: DaughterBridget, [phone]  Requesting an RX refill or new RX.Is this a refill or new RX: RefillRX name and strength (copy/paste from chart):  clopidogreL (PLAVIX) 75 mg tabletIs this a 30 day or 90 day RX: 90Pharmacy name and phone # (copy/paste from chart):  [address], [place] - [address] [facility] [place] [id number] HotGrindsBigCalcMERUNM [facility] [place] [address]Coene: [phone] Fax: [phone] doctors have asked that we provide their patients with the following 2 reminders -- prescription refills can take up to 72 hours, and a friendly reminder that in the future you can use your [facility] account to request refills: YesRequesting an RX refill or new RX.Is this a refill or new RX: RefillRX name and strength (copy/paste from chart):  QUEtiapine (SEROQUEL) 25 MG TabIs this a 30 day or 90 day RX: 90Pharmacy name and phone # (copy/paste from chart):  [address], [place] - [address] [facility] [place] [id number] HotGrinds365netVDMERAUX [place] [address]Vwptr: [phone] Fax: [phone] doctors have asked that we provide their patients with the following 2 reminders -- prescription refills can take up to 72 hours, and a friendly reminder that in the future you can use your [facility] account to request refills: YesRequesting an RX refill or new RX.Is this a refill or new RX: RefillRX name and strength (copy/paste from chart):  QUEtiapine (SEROQUEL) 50 MG TabIs this a 30 day or 90 day RX: 90Pharmacy name and phone # (copy/paste from chart):  [address] CRUZITOUNM [facility], [place] - [address] HotGrindsOP [place] [id number] HotGrinds365netVDMERAUX [place] [address]Jawes: [phone] Fax: [phone] doctors have asked that we provide their patients with the following 2 reminders -- prescription refills can take up to 72 hours, and a friendly reminder that in the future you can use your [facility] account to  request refills: YesRequesting an RX refill or new RX.Is this a refill or new RX: RefillRX name and strength (copy/paste from chart):  omeprazole (PRILOSEC) 40 MG capsuleIs this a 30 day or 90 day RX: 90Pharmacy name and phone # (copy/paste from chart):  [address], [place] - [address] [facility] LORETTACounts include 234 beds at the [facility]2500 [facility] [place] [facility] [place] [address]Yljyp: [phone] Fax: [phone] doctors have asked that we provide their patients with the following 2 reminders -- prescription refills can take up to 72 hours, and a friendly reminder that in the future you can use your [facility] account to request refills: YesRequesting an RX refill or new RX.Is this a refill or new RX: RefillRX name and strength (copy/paste from chart):  traMADoL (ULTRAM) 50 mg tabletIs this a 30 day or 90 day RX: 90Pharmacy name and phone # (copy/paste from chart):  [address], [place] - [address] [facility]VD2500 [facility] [place] [address]Tlsbc: [phone] Fax: [phone] doctors have asked that we provide their patients with the following 2 reminders -- prescription refills can take up to 72 hours, and a friendly reminder that in the future you can use your [facility] account to request refills: YesRequesting an RX refill or new RX.Is this a refill or new RX: RefillRX name and strength (copy/paste from chart):  atorvastatin (LIPITOR) 80 MG tabletIs this a 30 day or 90 day RX: 90Pharmacy name and phone # (copy/paste from chart):  [address], [place] - [address] [facility] [place] [id number] [facility] Fit [facility]ugitivesMur[facility] [place] [address]Ttilx: [phone] Fax: [phone] doctors have asked that we provide their patients with the following 2 reminders -- prescription refills can take up to 72 hours, and a friendly reminder that in the future you can use your [facility] account to request refills: YesRequesting an RX refill or new RX.Is this a refill or new RX:  RefillRX name and strength (copy/paste from chart):  FLUoxetine 40 MG capsuleIs this a 30 day or 90 day RX: 90Pharmacy name and phone # (copy/paste from chart):  [place] [address] [address] [place] - [address] [facility]50 CubesJORGE [place] [id number] [facility]50 CubesScreenTagOaklawn Hospital [address]Bhxon: [phone] Fax: [phone] doctors have asked that we provide their patients with the following 2 reminders -- prescription refills can take up to 72 hours, and a friendly reminder that in the future you can use your [facility] account to request refills: YesRequesting an RX refill or new RX.Is this a refill or new RX: RefillRX name and strength (copy/paste from chart):  carbidopa-levodopa  mg (SINEMET)  mg per tabletIs this a 30 day or 90 day RX: 90Pharmacy name and phone # (copy/paste from chart):  [address] [place] - [place] [facility]50 CubesU.[address] [place] [id number] [facility] [employer] [facility] [address]Jwfys: [phone] Fax: [phone] doctors have asked that we provide their patients with the following 2 reminders -- prescription refills can take up to 72 hours, and a friendly reminder that in the future you can use your [facility] account to request refills: Yes

## 2025-05-05 ENCOUNTER — CLINICAL SUPPORT (OUTPATIENT)
Dept: REHABILITATION | Facility: HOSPITAL | Age: 65
End: 2025-05-05
Payer: MEDICARE

## 2025-05-05 DIAGNOSIS — R47.01 EXPRESSIVE APHASIA: Primary | ICD-10-CM

## 2025-05-05 DIAGNOSIS — R48.2 APRAXIA: ICD-10-CM

## 2025-05-05 PROCEDURE — 92507 TX SP LANG VOICE COMM INDIV: CPT | Mod: PO

## 2025-05-05 NOTE — PROGRESS NOTES
Outpatient Rehab    Speech-Language Pathology Progress Note : Updated Plan of Care    Patient Name: Michael Dailey  MRN: [id number]  YOB: 1960  Encounter Date: 5/5/2025    Therapy Diagnosis:   Encounter Diagnoses   Name Primary?    Expressive aphasia Yes    Apraxia      Physician: Ivonne Cortes MD    Physician Orders: Eval and Treat  Medical Diagnosis: Dysarthria    Visit # / Visits Authorized: 14 / 20   Insurance Authorization Period: 2/10/2025 to 12/31/2025  Date of Evaluation:  2/13/2025   Plan of Care Certification: 5/8/2025 to 7/14/2025     Time In: 1015   Time Out: 1100  Total Time (in minutes): 45   Total Billable Time (in minutes): 45       Subjective   Pt arrived on time and in pleasant spirits.           Objective            Treatment   Treatment  Short Term Goals: (10 weeks) Current Progress:   Patient will complete subtests of the WAB to further characterize aphasia and establish goals    Progressing/ Not Met 5/5/2025      Goal met          2. Patient will provide 3+ words to describe a given picture/object at 80% success given min cues for initiation/word retrieval      Progressing/ Not Met 5/5/2025   Goal met     3. Patient will imitate words of increasing syllable length, starting with single-syllable words and progressing to multi-syllabic words, with 80% accuracy    Progressing/ Not Met 5/5/2025   -Not addressed in today's session.         4. Patient will expressively produce 3+ word phrase to state object function with 80% acc given initial phoneme cue as needed across 3 sessions.      Progressing/ Not Met 5/5/2025   - Pt consistently produced 3+ word phrases to state object function with 85% accuracy and initial phoneme cues as needed    Met x2       5. Patient will complete divided attention tasks in natural environment with 90% accuracy independently.     Met 5/5/2025   Goal met this date     6.  Patient will increase verbal utterance length from single words to 2-3 word phrases  in structured tasks with 80% accuracy over 3 consecutive sessions     Progressing/ Not Met 5/5/2025     Added this date   7. Patient will demonstrate increased word recall skills to improve overall expression of wants and needs by utilizing compensatory strategies, such as description, gestures, writing, and/or use of synonyms, with 80% accuracy and minimal verbal and visual cueing.     Progressing/ Not Met 5/5/2025   Added this date       8. Pt will perform variety of verbal closure tasks for phrases and sentences, given initial phoneme cue as needed, at 90% success rate across 3 sessions.     Progressing/ Not Met 5/5/2025   Added this date   9. Patient will identify signs of cognitive fatigue and request a break or rest period in structured and unstructured environments with minimal cues in 4/5 opportunities to support cognitive endurance and self-advocacy     Progressing/ Not Met 5/5/2025   Added this date   10. Patient will generate 5 items from a concrete category (e.g., fruits, animals) with moderate verbal cues in 2 minutes in 80% of opportunities.     Progressing/ Not Met 5/5/2025   Added this date       Time Entry(in minutes):  Speech Treatment (Individual) Time Entry: 45    Assessment & Plan   Assessment   Prognosis: Good      Assessment Details: Patient participated well in today's session focused on attention, expressive language, and updating plan of care. Pt able to complete divided attention task with minimal difficulty this date. Consistently provided 3+ word phrases during object function task with minimal cues needed. Majority of session spent discussing updated plan of care. 5 goals added this date. Pt and pt's spouse verbalized agreement on updated goals as well as ongoing goals. Pt participates well in therapy and is highly motivated to attend and meet each goal. See below for added goals. Patient will continue to benefit from skilled speech therapy services in order to improve expressive  language skills to communicate wants,needs, and emotions effectively.     Plan  From a speech language pathology perspective, the patient would benefit from: Skilled Rehab Services    Planned therapy interventions and modalities include: Speech/language therapy, Home program instruction, and Patient/family education.        Visit Frequency: 2 times Per Week for 10 Weeks.     This plan was discussed with Patient and Caregiver.   Discussion participants: Agreed Upon Plan of Care    Plan details: Continue plan of care for skilled speech therapy services      Patient will continue to benefit from skilled outpatient speech therapy to address the deficits listed in the problem list box on initial evaluation, provide pt/family education and to maximize pt's level of independence in the home and community environment.     Patient's spiritual, cultural, and educational needs considered and patient agreeable to plan of care and goals.     Goals:   Active       1. Short term goals       Stg 1 (Met)       Start:  04/09/25    Expected End:  05/08/25    Resolved:  04/28/25    Patient will provide 3+ words to describe a given picture/object at 80% success given min cues for initiation/word retrieval           Stg 2 (Ongoing)       Start:  04/09/25    Expected End:  05/08/25       Patient will imitate words of increasing syllable length, starting with single-syllable words and progressing to multi-syllabic words, with 80% accuracy         Stg 3 (Ongoing)       Start:  04/09/25    Expected End:  05/08/25       Patient will expressively produce 3+ word phrase to state object function with 80% acc given initial phoneme cue as needed across 3 sessions.         Stg 4 (Met)       Start:  04/09/25    Expected End:  05/08/25    Resolved:  05/05/25    Patient will complete divided attention tasks in natural environment with 90% accuracy independently.         Stg 5 (Met)       Start:  04/09/25    Expected End:  05/08/25    Resolved:   "04/14/25    Patient will respond accurately to simple "wh" questions for one word response with 90% acc              1. Short term goals contin       Stg 1       Start:  05/05/25    Expected End:  07/14/25       Patient will increase verbal utterance length from single words to 2-3 word phrases in structured tasks with 80% accuracy over 3 consecutive sessions          Stg 4       Start:  05/05/25    Expected End:  07/14/25       Patient will demonstrate increased word recall skills to improve overall expression of wants and needs by utilizing compensatory strategies, such as description, gestures, writing, and/or use of synonyms, with 80% accuracy and minimal verbal and visual cueing.         Stg 5       Start:  05/05/25    Expected End:  07/14/25       Pt will perform variety of verbal closure tasks for phrases and sentences, given initial phoneme cue as needed, at 90% success rate across 3 sessions.         Stg 6       Start:  05/05/25    Expected End:  07/14/25       Patient will identify signs of cognitive fatigue and request a break or rest period in structured and unstructured environments with minimal cues in 4/5 opportunities to support cognitive endurance and self-advocacy         Stg 7       Start:  05/05/25    Expected End:  07/14/25       Patient will generate 5 items from a concrete category (e.g., fruits, animals) with moderate verbal cues in 2 minutes in 80% of opportunities.             Long Term Goals       LTG 1  (Ongoing)       Start:  02/16/25    Expected End:  05/08/25       Pt will develop functional expressive language skills to communicate wants,needs, and emotions effectively to variety of communication partners in medical and home environments         LTG 2  (Ongoing)       Start:  02/16/25    Expected End:  05/08/25       Pt will participate in ongoing assessment of cognitive-linguistic skills.            Resolved       Short Term Goals        STG 1 (Met)       Start:  02/16/25    Expected " End:  03/30/25    Resolved:  04/09/25    Patient will complete subtests of the WAB to further characterize aphasia and establish goals              MARCIN Lanier-ARMANDO

## 2025-07-03 DIAGNOSIS — I10 PRIMARY HYPERTENSION: ICD-10-CM

## 2025-07-03 RX ORDER — AMLODIPINE BESYLATE 10 MG/1
10 TABLET ORAL DAILY
Qty: 90 TABLET | Refills: 3 | Status: SHIPPED | OUTPATIENT
Start: 2025-07-03 | End: 2026-07-03

## 2025-07-03 NOTE — TELEPHONE ENCOUNTER
Copied from CRM #[id number]. Topic: Medications - Medication Refill  >> Jul 3, 2025  9:57 AM Ricky wrote:  Requesting an RX refill or new RX.    Is this a refill or new RX: Short Term Refill - Out of town and forgot RX    RX name and strength (copy/paste from chart):  amLODIPine (NORVASC) 10 MG tablet      Is this a 30 day or 90 day RX:     Pharmacy name and phone # (copy/paste from chart):    [facility] [address] [place] [address]  Phone: [phone] Fax: [phone]       Who called and call back number: Pt Wife; [phone]. She said she is okay with paying for this with cash. He just needs his RX ASAP.   69 year old man here with acute hypoxic respiratory failure and ARDS secondary to viral pneumonia from COVID -19 course compliacated by VT arrest, DVT and MRSE bacteremia. The patient has a history of HTN, DM2, BPH, and Asthma.     Remains on lung protective ventilation wean fio2 and peep as tolerated    low grade fevers, most recent Bcx NGTD f/u ID recommendations  Oral bleeding f/u with ENT re packing in mouth    On repeat round of dexamethasone as per family request.      Prognosis  poor.

## 2025-07-03 NOTE — TELEPHONE ENCOUNTER
No care due was identified.  Health [facility] Embedded Care Due Messages. Reference number: [id number].   7/03/2025 10:21:17 AM CDT

## 2025-07-22 ENCOUNTER — CLINICAL SUPPORT (OUTPATIENT)
Dept: REHABILITATION | Facility: HOSPITAL | Age: 65
End: 2025-07-22
Payer: MEDICARE

## 2025-07-22 DIAGNOSIS — R47.01 EXPRESSIVE APHASIA: Primary | ICD-10-CM

## 2025-07-22 DIAGNOSIS — R48.2 APRAXIA: ICD-10-CM

## 2025-07-22 PROCEDURE — 92507 TX SP LANG VOICE COMM INDIV: CPT | Mod: PO

## 2025-07-22 NOTE — PROGRESS NOTES
Outpatient Rehab    Speech-Language Pathology Progress Note : Updated Plan of Care    Patient Name: Michael Dailey  MRN: [id number]  YOB: 1960  Encounter Date: 7/22/2025    Therapy Diagnosis:   Encounter Diagnoses   Name Primary?    Expressive aphasia Yes    Apraxia      Physician: Ivonne Cortes MD    Physician Orders: Eval and Treat  Medical Diagnosis: Dysarthria  Surgical Diagnosis: Not applicable for this Episode   Surgical Date: Not applicable for this Episode  Days Since Last Surgery: Not applicable for this Episode    Visit # / Visits Authorized: 15 / 20   Insurance Authorization Period: 2/10/2025 to 12/31/2025  Date of Evaluation: 2/13/2025   Plan of Care Certification: 5/5/2025 to 7/14/2025 [facility] [place] 7/22/25-9/16/25     Time In: 1015   Time Out: 1100  Total Time (in minutes): 45   Total Billable Time (in minutes): 45    Precautions:  Additional Precautions and Protocol Details: Standard    Subjective   Patient arrived on time. He has not been to therapy in 2 months. Updating POC this date.  Pain reported as 0/10. No pain reported      Objective          Treatment  Short Term Goals: (10 weeks) Current Progress:   1. Patient will imitate words of increasing syllable length, starting with single-syllable words and progressing to multi-syllabic words, with 80% accuracy     Progressing/ Not Met 5/5/2025   -Continuation from previous POC            2. Patient will expressively produce 3+ word phrase to state object function with 80% acc given initial phoneme cue as needed across 3 sessions.       Progressing/ Not Met 5/5/2025   - Continuation from previous POC     Met x2         3.  Patient will increase verbal utterance length from single words to 2-3 word phrases in structured tasks with 80% accuracy over 3 consecutive sessions      Progressing/ Not Met 5/5/2025   Continuation from previous POC   4. Patient will demonstrate increased word recall skills to improve overall expression of wants and  needs by utilizing compensatory strategies, such as description, gestures, writing, and/or use of synonyms, with 80% accuracy and minimal verbal and visual cueing.      Progressing/ Not Met 2025   Continuation from previous POC         5. Patient will identify signs of cognitive fatigue and request a break or rest period in structured and unstructured environments with minimal cues in 4/5 opportunities to support cognitive endurance and self-advocacy      Progressing/ Not Met 2025   Continuation from previous POC   6. Pt will participate in Semantic Feature Analysis for 4-5 nouns per session to address word finding strategies.      Progressing/ Not Met 2025   Added this date      Western Aphasia Battery - Revised (WAB-R) was administered to evaluate the patient's receptive and expressive language function.The purpose stated in the manual for the WAB-R is to determine the presence, severity, and type of aphasia; measure the patient's level of performance to provide a baseline for detecting change over time; provide a comprehensive assessment of the patients language strengths and deficits in order to guide treatment and management; infer the location and etiology of the lesion causing the aphasia.  The following results were revealed:     Spontaneous Speech Score: 12   Auditory Comprehension Score: 9.2 / 10  Repetition Score:   8.8 /10  Naming and Word Finding Score: 8/ 10  Aphasia Quotient (AQ):   76 / 100  Aphasia Classification: Mild transcortical sensory     Subtest Results:   Information Content: 8 / 10  Fluency, Grammatical Competence, and Paraphasias: 4 /10  Yes / No Questions: 60 / 60  Auditory Word Recognition: 60 / 60  Sequential Commands: 64 / 80  Repetition: 8.8 /100  Object Namin /60  Word Fluency:  20  Sentence Completion: 10  Responsive Speech: 10 / 10    Time Entry(in minutes):  Speech Treatment (Individual) Time Entry: 45    Assessment & Plan   Assessment   Patient  participated well in today's session, which focused on re-evaluation using the Western Aphasia Battery (WAB) following a two-month gap in treatment. He continues to present with expressive aphasia, characterized by significant difficulty with overall communication, including word finding, formulating responses during conversational exchanges, and reduced word fluency. Results of the WAB are consistent with the initial evaluation; however, a decrease in the spontaneous speech score was noted. Goals were reviewed and revised as appropriate, and continue to reflect the patient's current needs. The patient will continue to benefit from skilled speech-language therapy services to address the deficits identified in the evaluation.   Evaluation/Treatment Tolerance: Patient tolerated treatment well  Plan  From a speech language pathology perspective, the patient would benefit from: Skilled Rehab Services  Planned therapy interventions and modalities include: Speech/language therapy, Home program instruction, and Patient/family education.              Visit Frequency: 2 times Per Week for 8 Weeks.       This plan was discussed with Patient.   Discussion participants: Agreed Upon Plan of Care  Plan details: Continue plan of care for skilled speech therapy services        The patient will continue to benefit from skilled outpatient speech therapy in order to address the deficits listed in the problem list on the initial evaluation, provide patient and family education, and maximize the patients level of independence in the home and community environments.     The patient's spiritual, cultural, and educational needs were considered, and the patient is agreeable to the plan of care and goals.     Goals:   Active       1. Short term goals       Stg 1 (Met)       Start:  04/09/25    Expected End:  05/08/25    Resolved:  04/28/25    Patient will provide 3+ words to describe a given picture/object at 80% success given min cues for  "initiation/word retrieval           Stg 2 (Ongoing)       Start:  04/09/25    Expected End:  09/16/25       Patient will imitate words of increasing syllable length, starting with single-syllable words and progressing to multi-syllabic words, with 80% accuracy         Stg 3 (Ongoing)       Start:  04/09/25    Expected End:  09/16/25       Patient will expressively produce 3+ word phrase to state object function with 80% acc given initial phoneme cue as needed across 3 sessions.         Stg 4 (Met)       Start:  04/09/25    Expected End:  05/08/25    Resolved:  05/05/25    Patient will complete divided attention tasks in natural environment with 90% accuracy independently.         Stg 5 (Met)       Start:  04/09/25    Expected End:  05/08/25    Resolved:  04/14/25    Patient will respond accurately to simple "wh" questions for one word response with 90% acc              1. Short term goals contin       Stg 1       Start:  05/05/25    Expected End:  09/16/25       Patient will increase verbal utterance length from single words to 2-3 word phrases in structured tasks with 80% accuracy over 3 consecutive sessions          Stg 4       Start:  05/05/25    Expected End:  09/16/25       Patient will demonstrate increased word recall skills to improve overall expression of wants and needs by utilizing compensatory strategies, such as description, gestures, writing, and/or use of synonyms, with 80% accuracy and minimal verbal and visual cueing.         Stg 5       Start:  05/05/25    Expected End:  09/16/25       Pt will perform variety of verbal closure tasks for phrases and sentences, given initial phoneme cue as needed, at 90% success rate across 3 sessions.         Stg 6       Start:  05/05/25    Expected End:  09/16/25       Patient will identify signs of cognitive fatigue and request a break or rest period in structured and unstructured environments with minimal cues in 4/5 opportunities to support cognitive endurance " and self-advocacy         Stg 7       Start:  05/05/25    Expected End:  09/16/25       Pt will participate in Semantic Feature Analysis for 4-5 nouns per session to address word finding strategies.             Long Term Goals       LTG 1  (Ongoing)       Start:  02/16/25    Expected End:  09/16/25       Pt will develop functional expressive language skills to communicate wants,needs, and emotions effectively to variety of communication partners in medical and home environments         LTG 2  (Ongoing)       Start:  02/16/25    Expected End:  09/16/25       Pt will participate in ongoing assessment of cognitive-linguistic skills.            Resolved       Short Term Goals        STG 1 (Met)       Start:  02/16/25    Expected End:  03/30/25    Resolved:  04/09/25    Patient will complete subtests of the WAB to further characterize aphasia and establish goals              MARCIN Lanier-ARMANDO

## 2025-07-30 ENCOUNTER — CLINICAL SUPPORT (OUTPATIENT)
Dept: REHABILITATION | Facility: HOSPITAL | Age: 65
End: 2025-07-30
Payer: MEDICARE

## 2025-07-30 DIAGNOSIS — R47.01 EXPRESSIVE APHASIA: Primary | ICD-10-CM

## 2025-07-30 DIAGNOSIS — R48.2 APRAXIA: ICD-10-CM

## 2025-07-30 PROCEDURE — 92507 TX SP LANG VOICE COMM INDIV: CPT | Mod: PO

## 2025-07-30 NOTE — PROGRESS NOTES
Outpatient Rehab  Speech-Language Pathology Visit    Patient Name: Michael Dailey  MRN: [id number]  YOB: 1960  Encounter Date: 7/30/2025    Therapy Diagnosis:   Encounter Diagnoses   Name Primary?    Expressive aphasia Yes    Apraxia      Physician: Ivonne Cortes MD    Physician Orders: Eval and Treat  Medical Diagnosis: Dysarthria  Surgical Diagnosis: Not applicable for this Episode   Surgical Date: Not applicable for this Episode  Days Since Last Surgery: Not applicable for this Episode    Visit # / Visits Authorized: 16 / 20   Insurance Authorization Period: 2/10/2025 to 12/31/2025  Date of Evaluation: 2/13/2025   Plan of Care Certification: 7/22/2025 to 9/16/2025      Time In: 1100   Time Out: 1153  Total Time (in minutes): 53   Total Billable Time (in minutes): 53    FOTO:  Intake Score (%): Not applicable for this Episode  Survey Score 2 (%): Not applicable for this Episode  Survey Score 3 (%): Not applicable for this Episode    Precautions:  Additional Precautions and Protocol Details: Standard    Subjective   Pt arrived to scheduled therapy appointment in pleasant spirits. No family present for session..  Pain reported as 0/10.        Objective     Treatment  Short Term Goals: (10 weeks) Current Progress:   1. Patient will imitate words of increasing syllable length, starting with single-syllable words and progressing to multi-syllabic words, with 80% accuracy     Progressing/ Not Met 5/5/2025   Informally targeted this date during SFA. Pt benefited from listening to SLP produce word, taking a breath prior to production of word and tapping out each syllable of a multi-syllabic word.             2. Patient will expressively produce 3+ word phrase to state object function with 80% acc given initial phoneme cue as needed across 3 sessions.       Progressing/ Not Met 5/5/2025   Not formally targeted this date.      Met x2         3.  Patient will increase verbal utterance length from single words  "to 2-3 word phrases in structured tasks with 80% accuracy over 3 consecutive sessions      Progressing/ Not Met 5/5/2025   Indirectly targeted during SFA. Following completion of SFA graphic organizer, SLP would ask pt questions about noun (i.e. where can you find it?). Pt would respond with ~2-3 utterances such as "in the kitchen"     Of note, phonemic paraphasias observed.        4. Patient will demonstrate increased word recall skills to improve overall expression of wants and needs by utilizing compensatory strategies, such as description, gestures, writing, and/or use of synonyms, with 80% accuracy and minimal verbal and visual cueing.      Progressing/ Not Met 5/5/2025   Pt provided handout with word finding/retrieval strategies. Handout verbally reviewed with pt.     Pt recalled some strategies from previous word finding handouts he had been given.       5. Patient will identify signs of cognitive fatigue and request a break or rest period in structured and unstructured environments with minimal cues in 4/5 opportunities to support cognitive endurance and self-advocacy      Progressing/ Not Met 5/5/2025   Pt did not identify signs of cognitive fatigue and request a break or rest period throughout structure therapy activities this date.    6. Pt will participate in Semantic Feature Analysis for 4-5 nouns per session to address word finding strategies.      Progressing/ Not Met 5/5/2025   Initiated this date. SLP educated pt on purpose and research behind SFA.     X2 SFA graphic organizer completed with MAX verbal cueing for completion    Nouns targteted: keys, coffee cup       Time Entry(in minutes):  Speech Treatment (Individual) Time Entry: 53    Assessment & Plan   Assessment  Pt continues to present with expressive aphasia c/b word finding difficulties, formulating complete responses throughout conversational exchanges, and reduced word fluency -- all of which significantly impact communication. Therapy " this date focused on review and implementation of word finding strategies throughout conversation as well as SFA to support a noun's semantic connections in the brain. Pt communicated that he enjoyed SFA and felt that it provided him with a good challenge. During SFA, pt worked on written communication -- pt wrote the semantic connections talked about with SLP in the graphic organizer. Pt continues to demonstrate difficulty producing multi-syllabic words on initial production attempt. However, improvement was seen when pt utilized finger tapping for each syllable. The pt continues to benefit from skilled speech-language therapy services to address the deficits identified in the evaluation.   Evaluation/Treatment Tolerance: Patient tolerated treatment well    The patient will continue to benefit from skilled outpatient speech therapy in order to address the deficits listed in the problem list on the initial evaluation, provide patient and family education, and maximize the patients level of independence in the home and community environments.     The patient's spiritual, cultural, and educational needs were considered, and the patient is agreeable to the plan of care and goals.     Plan  Continue skilled speech-language therapy 2x/week for 8 weeks.      Goals:   Active       1. Short term goals       Stg 1 (Met)       Start:  04/09/25    Expected End:  05/08/25    Resolved:  04/28/25    Patient will provide 3+ words to describe a given picture/object at 80% success given min cues for initiation/word retrieval           Stg 2 (Ongoing)       Start:  04/09/25    Expected End:  09/16/25       Patient will imitate words of increasing syllable length, starting with single-syllable words and progressing to multi-syllabic words, with 80% accuracy         Stg 3 (Ongoing)       Start:  04/09/25    Expected End:  09/16/25       Patient will expressively produce 3+ word phrase to state object function with 80% acc given  "initial phoneme cue as needed across 3 sessions.         Stg 4 (Met)       Start:  04/09/25    Expected End:  05/08/25    Resolved:  05/05/25    Patient will complete divided attention tasks in natural environment with 90% accuracy independently.         Stg 5 (Met)       Start:  04/09/25    Expected End:  05/08/25    Resolved:  04/14/25    Patient will respond accurately to simple "wh" questions for one word response with 90% acc              1. Short term goals contin       Stg 1       Start:  05/05/25    Expected End:  09/16/25       Patient will increase verbal utterance length from single words to 2-3 word phrases in structured tasks with 80% accuracy over 3 consecutive sessions          Stg 4       Start:  05/05/25    Expected End:  09/16/25       Patient will demonstrate increased word recall skills to improve overall expression of wants and needs by utilizing compensatory strategies, such as description, gestures, writing, and/or use of synonyms, with 80% accuracy and minimal verbal and visual cueing.         Stg 5       Start:  05/05/25    Expected End:  09/16/25       Pt will perform variety of verbal closure tasks for phrases and sentences, given initial phoneme cue as needed, at 90% success rate across 3 sessions.         Stg 6       Start:  05/05/25    Expected End:  09/16/25       Patient will identify signs of cognitive fatigue and request a break or rest period in structured and unstructured environments with minimal cues in 4/5 opportunities to support cognitive endurance and self-advocacy         Stg 7       Start:  05/05/25    Expected End:  09/16/25       Pt will participate in Semantic Feature Analysis for 4-5 nouns per session to address word finding strategies.             Long Term Goals       LTG 1  (Ongoing)       Start:  02/16/25    Expected End:  09/16/25       Pt will develop functional expressive language skills to communicate wants,needs, and emotions effectively to variety of " communication partners in medical and home environments         LTG 2  (Ongoing)       Start:  02/16/25    Expected End:  09/16/25       Pt will participate in ongoing assessment of cognitive-linguistic skills.            Resolved       Short Term Goals        STG 1 (Met)       Start:  02/16/25    Expected End:  03/30/25    Resolved:  04/09/25    Patient will complete subtests of the WAB to further characterize aphasia and establish goals              YOSELIN Chirinos, CF-SLP   Speech-Language Pathology Clinical Fellow

## 2025-07-31 ENCOUNTER — TELEPHONE (OUTPATIENT)
Dept: PRIMARY CARE CLINIC | Facility: CLINIC | Age: 65
End: 2025-07-31
Payer: MEDICARE

## 2025-07-31 NOTE — TELEPHONE ENCOUNTER
Copied from CRM #[id number]. Topic: Medications - Medication Refill  >> Jul 31, 2025  1:06 PM Bronwyn wrote:  Type:  RX Refill Request    Who Called: Care giver, Bridget  Refill or New Rx: Refill  RX Name and Strength: traMADoL (ULTRAM) 50 mg tablet  How is the patient currently taking it? (ex. 1XDay): maximino 50 mg by mouth every 6 (six) hours as needed.  Is this a 30 day or 90 day RX: 30  Preferred Pharmacy with phone number:   [id number] [place] [facility] [address]  [address] [facility] [facility] [address]  Phone: [phone] Fax: [phone]  Local or Mail Order: Local  Ordering Provider: Dr Bennett  Would the patient rather a call back or a response via [facility]?  Call back  Best Call Back Number:[phone]  Additional Information: N/A

## 2025-08-04 ENCOUNTER — CLINICAL SUPPORT (OUTPATIENT)
Dept: REHABILITATION | Facility: HOSPITAL | Age: 65
End: 2025-08-04
Payer: MEDICARE

## 2025-08-04 DIAGNOSIS — R47.01 EXPRESSIVE APHASIA: Primary | ICD-10-CM

## 2025-08-04 DIAGNOSIS — R48.2 APRAXIA: ICD-10-CM

## 2025-08-04 PROCEDURE — 92507 TX SP LANG VOICE COMM INDIV: CPT | Mod: PO

## 2025-08-04 NOTE — PROGRESS NOTES
Outpatient Rehab  Speech-Language Pathology Visit    Patient Name: Michael Dailey  MRN: [id number]  YOB: 1960  Encounter Date: 8/4/2025    Therapy Diagnosis:   Encounter Diagnoses   Name Primary?    Expressive aphasia Yes    Apraxia      Physician: Ivonne Cortes MD    Physician Orders: Eval and Treat  Medical Diagnosis: Dysarthria  Surgical Diagnosis: Not applicable for this Episode   Surgical Date: Not applicable for this Episode  Days Since Last Surgery: Not applicable for this Episode    Visit # / Visits Authorized: 17 / 20   Insurance Authorization Period: 2/10/2025 to 12/31/2025  Date of Evaluation: 2/13/2025   Plan of Care Certification: 7/22/2025 to 9/16/2025      Time In: 1101   Time Out: 1153  Total Time (in minutes): 52   Total Billable Time (in minutes): 52    FOTO:  Intake Score (%): Not applicable for this Episode  Survey Score 2 (%): Not applicable for this Episode  Survey Score 3 (%): Not applicable for this Episode    Precautions:  Additional Precautions and Protocol Details: Standard    Subjective   Pt arrived to session on time and in pleasant spirits. Pt gave SLP note from significant other -- note asking for description of what type of tasks are being done in therapy sessions. Significant other wants to work with pt at home..  Pain reported as 0/10.        Objective          Treatment     Short Term Goals: (10 weeks) Current Progress:   1. Patient will imitate words of increasing syllable length, starting with single-syllable words and progressing to multi-syllabic words, with 80% accuracy     Progressing/ Not Met 5/5/2025   Pt imitated 100% (25/25) of monosyllabic words with no visual or audible grouping. All imitated 1-syllable words were 100% intelligible.     The Source for Apraxia Pg 131     Pt imitated bisyllabic words with 84% accuracy (21/25) IND. Increased to 25/25 when given repetition of word as well as MIN verbal cues to utilize tapping strategy to break word up. Visual  grouping observed.     The Source for Apraxia Pg 134    2. Patient will expressively produce 3+ word phrase to state object function with 80% acc given initial phoneme cue as needed across 3 sessions.       Progressing/ Not Met 5/5/2025   Not targeted this date.      Met x2         3.  Patient will increase verbal utterance length from single words to 2-3 word phrases in structured tasks with 80% accuracy over 3 consecutive sessions      Progressing/ Not Met 5/5/2025   Continued to indirectly targeted during SFA. Following completion of SFA, SLP asked pt questions about target noun (i.e. what category does it belong to?). Pt would respond with 2-3 word utterances     Pt benefited from reading 2-3 word utterances off of SFA graphic organizer.    4. Patient will demonstrate increased word recall skills to improve overall expression of wants and needs by utilizing compensatory strategies, such as description, gestures, writing, and/or use of synonyms, with 80% accuracy and minimal verbal and visual cueing.      Progressing/ Not Met 5/5/2025   Pt did not bring word finding strategy handout to session this date. Pt recalled 0/7 word finding strategies IND. Pt and SLP verbally reviewed strategies -- SLP provided written/visual support.        5. Patient will identify signs of cognitive fatigue and request a break or rest period in structured and unstructured environments with minimal cues in 4/5 opportunities to support cognitive endurance and self-advocacy      Progressing/ Not Met 5/5/2025   Pt did not identify signs of cognitive fatigue and request a break during structure therapy activities this date.     SLP to remind/encourage pt to do so throughout session during next scheduled session    6. Pt will participate in Semantic Feature Analysis for 4-5 nouns per session to address word finding strategies.      Progressing/ Not Met 5/5/2025   Pt and SLP completed x2 SFA.     Pt provided category for target nouns x2 given  MIN verbal cues.     Pt provided location for target nouns x1 IND and x1 given MIN verbal cues.     Pt provided properties of target noun x2 given MIN to MOD verbal cues.     Pt provided action of target nouns x2 IND.     Pt provided association of target nouns x1 IND (off-target response; however still appropriate) and x1 given MIN verbal cues.     Pt provided target noun's use x2 IND, however required expansion from SLP.      Nouns targteted: clock & scissors     SFA x2 sent home as part of home program. Nouns sent home include: remote & razor        Time Entry(in minutes):  Speech Treatment (Individual) Time Entry: 52    Assessment & Plan   Assessment  Pt continues to present with expressive aphasia c/b word finding difficulties, formulating complete responses throughout conversational exchanges, and reduced word fluency -- all of which significantly impact communication. Therapy this date focused on review of word finding strategies and continuation of SFA to support a noun's semantic connections in the brain. Throughout SFA, SLP would write pt's answers for each box in graphic organizer. To target written communication, pt would then copy SLP's written communication. While practicing written communication, pt benefits from positive reinforcement as well as verbal cues. Pt continues to have the most communication success when asked Y/N questions. Some repetitions required for more complex Y/N questions. SLP formally targeted imitation of monosyllabic and bisyllabic words this date. Pt showed little to no overt motor planning difficulties when imitating monosyllabic words, however showed increased difficulty for bisylllabic words. Pt benefited from repetition of target word as well as a verbal reminder to utilize tapping. All goals remain appropriate at this time and will be updated as necessary. The pt continues to benefit from skilled speech-language therapy services to address the deficits identified in the  "evaluation.   Evaluation/Treatment Tolerance: Patient tolerated treatment well    The patient will continue to benefit from skilled outpatient speech therapy in order to address the deficits listed in the problem list on the initial evaluation, provide patient and family education, and maximize the patients level of independence in the home and community environments.     The patient's spiritual, cultural, and educational needs were considered, and the patient is agreeable to the plan of care and goals.      Plan  Continue skilled speech-language therapy 2x/week for 8 weeks.        Goals:   Active       1. Short term goals       Stg 1 (Met)       Start:  04/09/25    Expected End:  05/08/25    Resolved:  04/28/25    Patient will provide 3+ words to describe a given picture/object at 80% success given min cues for initiation/word retrieval           Stg 2 (Ongoing)       Start:  04/09/25    Expected End:  09/16/25       Patient will imitate words of increasing syllable length, starting with single-syllable words and progressing to multi-syllabic words, with 80% accuracy         Stg 3 (Ongoing)       Start:  04/09/25    Expected End:  09/16/25       Patient will expressively produce 3+ word phrase to state object function with 80% acc given initial phoneme cue as needed across 3 sessions.         Stg 4 (Met)       Start:  04/09/25    Expected End:  05/08/25    Resolved:  05/05/25    Patient will complete divided attention tasks in natural environment with 90% accuracy independently.         Stg 5 (Met)       Start:  04/09/25    Expected End:  05/08/25    Resolved:  04/14/25    Patient will respond accurately to simple "wh" questions for one word response with 90% acc              1. Short term goals contin       Stg 1       Start:  05/05/25    Expected End:  09/16/25       Patient will increase verbal utterance length from single words to 2-3 word phrases in structured tasks with 80% accuracy over 3 consecutive " sessions          Stg 4       Start:  05/05/25    Expected End:  09/16/25       Patient will demonstrate increased word recall skills to improve overall expression of wants and needs by utilizing compensatory strategies, such as description, gestures, writing, and/or use of synonyms, with 80% accuracy and minimal verbal and visual cueing.         Stg 5       Start:  05/05/25    Expected End:  09/16/25       Pt will perform variety of verbal closure tasks for phrases and sentences, given initial phoneme cue as needed, at 90% success rate across 3 sessions.         Stg 6       Start:  05/05/25    Expected End:  09/16/25       Patient will identify signs of cognitive fatigue and request a break or rest period in structured and unstructured environments with minimal cues in 4/5 opportunities to support cognitive endurance and self-advocacy         Stg 7       Start:  05/05/25    Expected End:  09/16/25       Pt will participate in Semantic Feature Analysis for 4-5 nouns per session to address word finding strategies.             Long Term Goals       LTG 1  (Ongoing)       Start:  02/16/25    Expected End:  09/16/25       Pt will develop functional expressive language skills to communicate wants,needs, and emotions effectively to variety of communication partners in medical and home environments         LTG 2  (Ongoing)       Start:  02/16/25    Expected End:  09/16/25       Pt will participate in ongoing assessment of cognitive-linguistic skills.            Resolved       Short Term Goals        STG 1 (Met)       Start:  02/16/25    Expected End:  03/30/25    Resolved:  04/09/25    Patient will complete subtests of the WAB to further characterize aphasia and establish goals              YOSELIN Chirinos, CF-SLP   Speech-Language Pathology Clinical Fellow

## 2025-08-06 ENCOUNTER — CLINICAL SUPPORT (OUTPATIENT)
Dept: REHABILITATION | Facility: HOSPITAL | Age: 65
End: 2025-08-06
Payer: MEDICARE

## 2025-08-06 DIAGNOSIS — R47.01 EXPRESSIVE APHASIA: Primary | ICD-10-CM

## 2025-08-06 DIAGNOSIS — R48.2 APRAXIA: ICD-10-CM

## 2025-08-06 PROCEDURE — 92507 TX SP LANG VOICE COMM INDIV: CPT | Mod: PO

## 2025-08-06 NOTE — PROGRESS NOTES
Outpatient Rehab    Speech-Language Pathology Visit    Patient Name: Michael Dailey  MRN: [id number]  YOB: 1960  Encounter Date: 8/6/2025    Therapy Diagnosis:   Encounter Diagnoses   Name Primary?    Expressive aphasia Yes    Apraxia      Physician: Ivonne Cortes MD    Physician Orders: Eval and Treat  Medical Diagnosis: Dysarthria  Surgical Diagnosis: Not applicable for this Episode   Surgical Date: Not applicable for this Episode  Days Since Last Surgery: Not applicable for this Episode    Visit # / Visits Authorized: 18 / 20   Insurance Authorization Period: 2/10/2025 to 12/31/2025  Date of Evaluation: 2/13/2025   Plan of Care Certification: 7/22/2025 to 9/16/2025      Time In: 1100   Time Out: 1145  Total Time (in minutes): 45   Total Billable Time (in minutes): 45    Precautions:  Additional Precautions and Protocol Details: Standard    Subjective   Patient arrived on time and in pleasant spirits. Patient reports unable to complete homework sent home last session.  Pain reported as 0/10. No pain reported      Objective           Treatment     Short Term Goals: (10 weeks) Current Progress:   1. Patient will imitate words of increasing syllable length, starting with single-syllable words and progressing to multi-syllabic words, with 80% accuracy     Progressing/ Not Met 5/5/2025   -Not addressed in today's session.     2. Patient will expressively produce 3+ word phrase to state object function with 80% acc given initial phoneme cue as needed across 3 sessions.       Progressing/ Not Met 5/5/2025   Not targeted this date.      Met x2         3.  Patient will increase verbal utterance length from single words to 2-3 word phrases in structured tasks with 80% accuracy over 3 consecutive sessions      Progressing/ Not Met 5/5/2025   -Patient provided 2-3 word phrases during structured SFA task with 90% accuracy    Met x1   4. Patient will demonstrate increased word recall skills to improve overall  expression of wants and needs by utilizing compensatory strategies, such as description, gestures, writing, and/or use of synonyms, with 80% accuracy and minimal verbal and visual cueing.      Progressing/ Not Met 5/5/2025   -Not addressed in today's session.        5. Patient will identify signs of cognitive fatigue and request a break or rest period in structured and unstructured environments with minimal cues in 4/5 opportunities to support cognitive endurance and self-advocacy      Progressing/ Not Met 5/5/2025   Pt did not identify signs of cognitive fatigue or request a break during structure therapy activities this date, despite prompts       6. Pt will participate in Semantic Feature Analysis for 4-5 nouns per session to address word finding strategies.      Progressing/ Not Met 5/5/2025   Pt and SLP completed x2 SFA (wallet, hat)     Pt provided category for target nouns x2 given MIN verbal cues.     Pt provided location for target nouns x1 IND; x1 with minimal cues    Pt provided properties of target noun x2 given MIN verbal cues.     Pt provided action of target nouns x2 IND.     Pt provided association of target nouns x1 minimal cues; time constraints did not allow association for 2nd noun    Pt provided target noun's use x1 IND; time constraints did not allow association for 2nd noun            Time Entry(in minutes):  Speech Treatment (Individual) Time Entry: 45    Assessment & Plan   Assessment  Patient participated well in today's session focused on SFA to support noun's semantic connections in the brain. Patient completed SFA for 2 nouns this date relevant to patient and he completed each with minimal cueing. Due to time constraints, unable to completely finish 2nd noun in session. Patient was informally assessed for agraphia due to concerns regarding written language. He presents with agraphia and goal will be added in future session. Current goals remain appropriate and will be updated as needed.  "  Evaluation/Treatment Tolerance: Patient tolerated treatment well    The patient will continue to benefit from skilled outpatient speech therapy in order to address the deficits listed in the problem list on the initial evaluation, provide patient and family education, and maximize the patients level of independence in the home and community environments.     The patient's spiritual, cultural, and educational needs were considered, and the patient is agreeable to the plan of care and goals.          Plan  Continue skilled speech-language therapy          Goals:   Active       1. Short term goals       Stg 1 (Met)       Start:  04/09/25    Expected End:  05/08/25    Resolved:  04/28/25    Patient will provide 3+ words to describe a given picture/object at 80% success given min cues for initiation/word retrieval           Stg 2 (Progressing)       Start:  04/09/25    Expected End:  09/16/25       Patient will imitate words of increasing syllable length, starting with single-syllable words and progressing to multi-syllabic words, with 80% accuracy         Stg 3 (Progressing)       Start:  04/09/25    Expected End:  09/16/25       Patient will expressively produce 3+ word phrase to state object function with 80% acc given initial phoneme cue as needed across 3 sessions.         Stg 4 (Met)       Start:  04/09/25    Expected End:  05/08/25    Resolved:  05/05/25    Patient will complete divided attention tasks in natural environment with 90% accuracy independently.         Stg 5 (Met)       Start:  04/09/25    Expected End:  05/08/25    Resolved:  04/14/25    Patient will respond accurately to simple "wh" questions for one word response with 90% acc              1. Short term goals contin       Stg 1 (Progressing)       Start:  05/05/25    Expected End:  09/16/25       Patient will increase verbal utterance length from single words to 2-3 word phrases in structured tasks with 80% accuracy over 3 consecutive sessions  "         Stg 4 (Progressing)       Start:  05/05/25    Expected End:  09/16/25       Patient will demonstrate increased word recall skills to improve overall expression of wants and needs by utilizing compensatory strategies, such as description, gestures, writing, and/or use of synonyms, with 80% accuracy and minimal verbal and visual cueing.         Stg 5 (Progressing)       Start:  05/05/25    Expected End:  09/16/25       Pt will perform variety of verbal closure tasks for phrases and sentences, given initial phoneme cue as needed, at 90% success rate across 3 sessions.         Stg 6 (Progressing)       Start:  05/05/25    Expected End:  09/16/25       Patient will identify signs of cognitive fatigue and request a break or rest period in structured and unstructured environments with minimal cues in 4/5 opportunities to support cognitive endurance and self-advocacy         Stg 7 (Progressing)       Start:  05/05/25    Expected End:  09/16/25       Pt will participate in Semantic Feature Analysis for 4-5 nouns per session to address word finding strategies.             Long Term Goals       LTG 1  (Progressing)       Start:  02/16/25    Expected End:  09/16/25       Pt will develop functional expressive language skills to communicate wants,needs, and emotions effectively to variety of communication partners in medical and home environments         LTG 2  (Progressing)       Start:  02/16/25    Expected End:  09/16/25       Pt will participate in ongoing assessment of cognitive-linguistic skills.            Resolved       Short Term Goals        STG 1 (Met)       Start:  02/16/25    Expected End:  03/30/25    Resolved:  04/09/25    Patient will complete subtests of the WAB to further characterize aphasia and establish goals              MARCIN Lanier-SLP

## 2025-08-08 ENCOUNTER — OFFICE VISIT (OUTPATIENT)
Dept: PRIMARY CARE CLINIC | Facility: CLINIC | Age: 65
End: 2025-08-08
Payer: MEDICARE

## 2025-08-08 VITALS
SYSTOLIC BLOOD PRESSURE: 130 MMHG | WEIGHT: 187.63 LBS | HEART RATE: 89 BPM | BODY MASS INDEX: 26.27 KG/M2 | RESPIRATION RATE: 18 BRPM | DIASTOLIC BLOOD PRESSURE: 80 MMHG | OXYGEN SATURATION: 97 % | HEIGHT: 71 IN

## 2025-08-08 DIAGNOSIS — L98.9 SKIN LESIONS, GENERALIZED: ICD-10-CM

## 2025-08-08 DIAGNOSIS — M79.601 RIGHT ARM PAIN: Primary | ICD-10-CM

## 2025-08-08 PROCEDURE — 99215 OFFICE O/P EST HI 40 MIN: CPT | Mod: PBBFAC,PN | Performed by: STUDENT IN AN ORGANIZED HEALTH CARE EDUCATION/TRAINING PROGRAM

## 2025-08-08 PROCEDURE — 99999 PR PBB SHADOW E&M-EST. PATIENT-LVL V: CPT | Mod: PBBFAC,,, | Performed by: STUDENT IN AN ORGANIZED HEALTH CARE EDUCATION/TRAINING PROGRAM

## 2025-08-08 RX ORDER — TRAMADOL HYDROCHLORIDE 50 MG/1
50 TABLET, FILM COATED ORAL EVERY 6 HOURS PRN
Qty: 28 TABLET | Refills: 0 | Status: SHIPPED | OUTPATIENT
Start: 2025-08-08

## 2025-08-08 RX ORDER — GALANTAMINE 4 MG/1
4 TABLET, FILM COATED ORAL 2 TIMES DAILY
COMMUNITY
Start: 2025-07-04

## 2025-08-08 NOTE — PROGRESS NOTES
"Subjective:       Patient ID: Michael Dailey is a 65 y.o. male.    Chief Complaint: Medication Refill      HPI:  65 y.o. male presents to [facility] with requests for refill on Tramadol.     Patient taking medication for right arm pains since CVA    Frequency?: Infrequently needing    Current pain?: Does have pains in arm with use. Tylenol can help, infrequent tramadol use.    Review of Systems   Constitutional:  Negative for chills, diaphoresis, fatigue and fever.   HENT:  Negative for congestion, sinus pressure, sneezing and sore throat.    Respiratory:  Negative for cough and shortness of breath.    Cardiovascular:  Negative for chest pain and palpitations.   Gastrointestinal:  Negative for abdominal pain, diarrhea, nausea and vomiting.   Musculoskeletal:  Negative for joint swelling and myalgias.   Skin:  Positive for rash (To axillary region on new deodorant). Negative for wound.        Assorted hyperpigmented lesions to back and skin tags/lesions to back and upper arms   Neurological:  Negative for dizziness, light-headedness and headaches.       Objective:      Vitals:    08/08/25 1059   BP: 130/80   BP Location: Right arm   Patient Position: Sitting   Pulse: 89   Resp: 18   SpO2: 97%   Weight: 85.1 kg (187 lb 9.8 oz)   Height: 5' 11" (1.803 m)     Physical Exam  Vitals reviewed.   Constitutional:       General: He is not in acute distress.     Appearance: Normal appearance. He is not ill-appearing.   HENT:      Head: Normocephalic and atraumatic.   Eyes:      General:         Right eye: No discharge.         Left eye: No discharge.      Conjunctiva/sclera: Conjunctivae normal.   Cardiovascular:      Rate and Rhythm: Normal rate.   Pulmonary:      Effort: Pulmonary effort is normal.   Musculoskeletal:         General: No deformity.   Skin:     Coloration: Skin is not jaundiced or pale.      Comments: Scattered Sks to back and upper arms, AK to left upper laterall arm, skin tag to left axillary region. Well " "demarcated erythema bilateral armpits. Had burning pain when started   Neurological:      General: No focal deficit present.      Mental Status: He is alert and oriented to person, place, and time.   Psychiatric:         Mood and Affect: Mood normal.         Behavior: Behavior normal.             Lab Results   Component Value Date     02/25/2025    K 4.3 02/25/2025     02/25/2025    CO2 27 02/25/2025    BUN 15 02/25/2025    CREATININE 0.8 02/25/2025    GLUCOSE 105 04/29/2024    ANIONGAP 9 02/25/2025     Lab Results   Component Value Date    HGBA1C 6.1 08/08/2024    HGBA1C 5.5 01/16/2019     No results found for: "BNP", "BNPTRIAGEBLO"    Lab Results   Component Value Date    WBC 8.23 02/25/2025    HGB 13.7 (L) 02/25/2025    HCT 41.6 02/25/2025     02/25/2025    GRAN 4.8 02/25/2025    GRAN 58.2 02/25/2025     Lab Results   Component Value Date    CHOL 108 (L) 02/25/2025    HDL 42 02/25/2025    LDLCALC 41.2 (L) 02/25/2025    TRIG 124 02/25/2025        Current Medications[1]        Assessment:       1. Right arm pain    2. Skin lesions, generalized           Plan:       Right arm pain  -     traMADoL (ULTRAM) 50 mg tablet; Take 1 tablet (50 mg total) by mouth every 6 (six) hours as needed for Pain.  Dispense: 28 tablet; Refill: 0  - Side effects of medication provided today and instructed patient to not drive or perform dangerous tasks while taking  - Continue conservative measures    Skin lesions, generalized  -     Ambulatory referral/consult to Dermatology; Future; Expected date: 08/15/2025    RTC in 6 months         [1]   Current Outpatient Medications:     amLODIPine (NORVASC) 10 MG tablet, Take 1 tablet (10 mg total) by mouth once daily., Disp: 90 tablet, Rfl: 3    atorvastatin (LIPITOR) 80 MG tablet, Take 1 tablet (80 mg total) by mouth once daily., Disp: 90 tablet, Rfl: 1    calcium carbonate/vitamin D3 (VITAMIN D-3 ORAL), Take by mouth., Disp: , Rfl:     carbidopa-levodopa  mg (SINEMET) "  mg per tablet, Take 1 tablet by mouth 3 (three) times daily., Disp: 90 tablet, Rfl: 1    clopidogreL (PLAVIX) 75 mg tablet, Take 1 tablet (75 mg total) by mouth once daily., Disp: 90 tablet, Rfl: 1    cyanocobalamin 1,000 mcg/mL injection, Inject 1 mL (1,000 mcg total) into the muscle every 30 days., Disp: 1 mL, Rfl: 11    FLUoxetine 40 MG capsule, Take 1 capsule (40 mg total) by mouth once daily., Disp: 90 capsule, Rfl: 1    gemfibroziL (LOPID) 600 MG tablet, Take 1 tablet (600 mg total) by mouth 2 (two) times daily before meals., Disp: 180 tablet, Rfl: 3    multivit with min-folic acid 0.4 mg Tab, Take 1 tablet by mouth once daily., Disp: , Rfl:     omeprazole (PRILOSEC) 40 MG capsule, Take 1 capsule (40 mg total) by mouth once daily., Disp: 90 capsule, Rfl: 1    QUEtiapine (SEROQUEL) 25 MG Tab, Take 1-2 tablets (25-50 mg total) by mouth every evening. (Patient taking differently: Take 25-50 mg by mouth every evening. Taking 75 mg hs), Disp: 60 tablet, Rfl: 2    galantamine (RAZADYNE) 4 MG Tab, Take 4 mg by mouth 2 (two) times daily. (Patient not taking: Reported on 8/8/2025), Disp: , Rfl:     traMADoL (ULTRAM) 50 mg tablet, Take 1 tablet (50 mg total) by mouth every 6 (six) hours as needed for Pain., Disp: 28 tablet, Rfl: 0

## 2025-08-13 ENCOUNTER — CLINICAL SUPPORT (OUTPATIENT)
Dept: REHABILITATION | Facility: HOSPITAL | Age: 65
End: 2025-08-13
Payer: MEDICARE

## 2025-08-13 DIAGNOSIS — R47.01 EXPRESSIVE APHASIA: Primary | ICD-10-CM

## 2025-08-13 DIAGNOSIS — R48.2 APRAXIA: ICD-10-CM

## 2025-08-13 PROCEDURE — 92507 TX SP LANG VOICE COMM INDIV: CPT | Mod: PO

## 2025-08-18 ENCOUNTER — CLINICAL SUPPORT (OUTPATIENT)
Dept: REHABILITATION | Facility: HOSPITAL | Age: 65
End: 2025-08-18
Payer: MEDICARE

## 2025-08-18 DIAGNOSIS — R48.2 APRAXIA: ICD-10-CM

## 2025-08-18 DIAGNOSIS — R47.01 EXPRESSIVE APHASIA: Primary | ICD-10-CM

## 2025-08-18 PROCEDURE — 92507 TX SP LANG VOICE COMM INDIV: CPT | Mod: PO

## 2025-08-20 ENCOUNTER — CLINICAL SUPPORT (OUTPATIENT)
Dept: REHABILITATION | Facility: HOSPITAL | Age: 65
End: 2025-08-20
Payer: MEDICARE

## 2025-08-20 DIAGNOSIS — R47.01 EXPRESSIVE APHASIA: Primary | ICD-10-CM

## 2025-08-20 DIAGNOSIS — R48.2 APRAXIA: ICD-10-CM

## 2025-08-20 PROCEDURE — 92507 TX SP LANG VOICE COMM INDIV: CPT | Mod: PO

## 2025-08-25 ENCOUNTER — CLINICAL SUPPORT (OUTPATIENT)
Dept: REHABILITATION | Facility: HOSPITAL | Age: 65
End: 2025-08-25
Payer: MEDICARE

## 2025-08-25 DIAGNOSIS — R48.2 APRAXIA: ICD-10-CM

## 2025-08-25 DIAGNOSIS — R47.01 EXPRESSIVE APHASIA: Primary | ICD-10-CM

## 2025-08-25 PROCEDURE — 92507 TX SP LANG VOICE COMM INDIV: CPT | Mod: PO

## 2025-08-27 ENCOUNTER — CLINICAL SUPPORT (OUTPATIENT)
Dept: REHABILITATION | Facility: HOSPITAL | Age: 65
End: 2025-08-27
Payer: MEDICARE

## 2025-08-27 DIAGNOSIS — R48.2 APRAXIA: ICD-10-CM

## 2025-08-27 DIAGNOSIS — R47.01 EXPRESSIVE APHASIA: Primary | ICD-10-CM

## 2025-08-27 PROCEDURE — 92507 TX SP LANG VOICE COMM INDIV: CPT | Mod: PO

## 2025-09-03 ENCOUNTER — CLINICAL SUPPORT (OUTPATIENT)
Dept: REHABILITATION | Facility: HOSPITAL | Age: 65
End: 2025-09-03
Payer: MEDICARE

## 2025-09-03 DIAGNOSIS — R47.01 EXPRESSIVE APHASIA: Primary | ICD-10-CM

## 2025-09-03 DIAGNOSIS — R48.2 APRAXIA: ICD-10-CM

## 2025-09-03 PROCEDURE — 92507 TX SP LANG VOICE COMM INDIV: CPT | Mod: PO
